# Patient Record
Sex: MALE | Race: BLACK OR AFRICAN AMERICAN | NOT HISPANIC OR LATINO | Employment: FULL TIME | URBAN - METROPOLITAN AREA
[De-identification: names, ages, dates, MRNs, and addresses within clinical notes are randomized per-mention and may not be internally consistent; named-entity substitution may affect disease eponyms.]

---

## 2019-02-14 ENCOUNTER — TREATMENT (OUTPATIENT)
Dept: PHYSICAL THERAPY | Facility: CLINIC | Age: 54
End: 2019-02-14

## 2019-02-14 ENCOUNTER — TRANSCRIBE ORDERS (OUTPATIENT)
Dept: PHYSICAL THERAPY | Facility: CLINIC | Age: 54
End: 2019-02-14

## 2019-02-14 DIAGNOSIS — Z98.890 S/P ARTHROSCOPIC PARTIAL MEDIAL MENISCECTOMY: Primary | ICD-10-CM

## 2019-02-14 DIAGNOSIS — G89.29 CHRONIC PAIN OF LEFT KNEE: Primary | ICD-10-CM

## 2019-02-14 DIAGNOSIS — M25.562 CHRONIC PAIN OF LEFT KNEE: Primary | ICD-10-CM

## 2019-02-14 PROCEDURE — 97110 THERAPEUTIC EXERCISES: CPT | Performed by: PHYSICAL THERAPIST

## 2019-02-14 PROCEDURE — 97161 PT EVAL LOW COMPLEX 20 MIN: CPT | Performed by: PHYSICAL THERAPIST

## 2019-02-14 NOTE — PROGRESS NOTES
Physical Therapy Initial Evaluation and Plan of Care    Patient: Tony Butler   : 1965  Diagnosis/ICD-10 Code:  Chronic pain of left knee [M25.562, G89.29]  Referring practitioner: No ref. provider found  Date of Initial Visit: 2019  Today's Date: 2019  Patient seen for 1 sessions             Subjective Evaluation    History of Present Illness  Date of surgery: 2019  Mechanism of injury: Pt dealt with some intermittent swelling at first and then it got swollen about 3 months ago and did not get better. He went to the MD and had an MRI which revealed an medial meniscus tear. He had arthroscopic medial menisectomy with a synovectomy.       Patient Occupation: Pt is a  - prolonged kneeling, laddes, standing    Precautions and Work Restrictions: Pt is off until cleared by MD to go back to work Quality of life: good    Pain  Current pain rating: 3  At best pain ratin  At worst pain ratin  Location: inside of the left knee  Quality: dull ache  Relieving factors: rest, ice and medications (percocet)  Aggravating factors: ambulation, stairs, standing and movement  Progression: no change    Diagnostic Tests  No diagnostic tests performed    Treatments  No previous or current treatments  Patient Goals  Patient goals for therapy: decreased edema, decreased pain, improved balance, increased motion, increased strength, independence with ADLs/IADLs and return to work             Objective       Palpation     Additional Palpation Details  Mild TTP noted along the medial joint line of the left knee     Active Range of Motion   Left Knee   Flexion: 90 degrees   Extension: 0 degrees     Additional Active Range of Motion Details  Pt was able to get to 90 degrees knee flexion without any increase in pain in the knee. He most likely had more available ROM, but ROM was limited due to the recent surgery    Strength/Myotome Testing     Left Hip   Planes of Motion   Extension: 4-  Abduction: 4-    Left  Knee   Flexion: 4+  Extension: 4  Quadriceps contraction: fair    Ambulation   Weight-Bearing Status   Weight-Bearing Status (Left): weight-bearing as tolerated     Ambulation: Level Surfaces     Additional Level Surfaces Ambulation Details  Decreased weight acceptance onto the left LE and decreased knee flexion noted during ambulation          Assessment & Plan     Assessment  Impairments: abnormal gait, abnormal muscle tone, abnormal or restricted ROM, activity intolerance, impaired balance, impaired physical strength, lacks appropriate home exercise program, pain with function and weight-bearing intolerance  Assessment details: Patient is a 54 year old male who comes to physical therapy s/p left medial menisectomy resulting in pain, decreased ROM, decreased strength, and inability to perform all essential functional activities. Pt will benefit from skilled PT services to address the above issues.     Prognosis details:   SHORT TERM GOALS:  2 weeks       1. Pt independent with HEP  2. Pt to demonstrate eyad hip strength 4/5 or greater to improve stability with ambulation  3. Pt to report being able to walk for 10 minutes without increasing pain in the left knee    LONG TERM GOALS:   6 weeks  1. Pt to demonstrate ability to perform full functional squat with good form and control of the knees and without increasing pain  2. Pt to demonstrate eyad hip strength to 4+/5 or greater to improve safety with ambulation on uneven surfaces  3. Pt to return to work full duty without increased pain in the left knee   4. Pt to demonstrate ability to perform step up/down 8 inch step x10 safely and without pain in the left knee     Functional Limitations: carrying objects, lifting, walking, uncomfortable because of pain, sitting and standing  Plan  Therapy options: will be seen for skilled physical therapy services  Planned modality interventions: cryotherapy, electrical stimulation/Russian stimulation, high voltage pulsed current  (pain management), iontophoresis, microcurrent electrical stimulation, TENS, thermotherapy (hydrocollator packs) and ultrasound  Planned therapy interventions: abdominal trunk stabilization, ADL retraining, balance/weight-bearing training, body mechanics training, flexibility, functional ROM exercises, gait training, home exercise program, IADL retraining, joint mobilization, manual therapy, motor coordination training, neuromuscular re-education, soft tissue mobilization, strengthening, stretching and therapeutic activities  Frequency: 2x week  Duration in weeks: 6  Treatment plan discussed with: patient        Manual Therapy:         mins  01661;  Therapeutic Exercise:    30     mins  59194;     Neuromuscular Morgan:        mins  99198;    Therapeutic Activity:          mins  79602;     Gait Training:           mins  78048;     Ultrasound:          mins  42791;    Electrical Stimulation:         mins  56673 ( );  Iontophoresis          mins 93905   Traction          mins  47618  Fluidotherapy          mins  92396  Dry Needling          mins self-pay  Paraffin          mins  27611    Timed Treatment:   30   mins   Total Treatment:     63   mins    PT SIGNATURE: Glenn Barber, PT, DPT, OCS, Cert. DN   DATE TREATMENT INITIATED: 2/14/2019    Initial Certification  Certification Period: 5/15/2019  I certify that the therapy services are furnished while this patient is under my care.  The services outlined above are required by this patient, and will be reviewed every 90 days.     PHYSICIAN:       DATE:     Please sign and return via fax to [phone].. Thank you, [facility].

## 2019-02-19 ENCOUNTER — TREATMENT (OUTPATIENT)
Dept: PHYSICAL THERAPY | Facility: CLINIC | Age: 54
End: 2019-02-19

## 2019-02-19 DIAGNOSIS — M25.562 CHRONIC PAIN OF LEFT KNEE: Primary | ICD-10-CM

## 2019-02-19 DIAGNOSIS — G89.29 CHRONIC PAIN OF LEFT KNEE: Primary | ICD-10-CM

## 2019-02-19 PROCEDURE — 97140 MANUAL THERAPY 1/> REGIONS: CPT | Performed by: PHYSICAL THERAPIST

## 2019-02-19 PROCEDURE — 97112 NEUROMUSCULAR REEDUCATION: CPT | Performed by: PHYSICAL THERAPIST

## 2019-02-19 PROCEDURE — 97110 THERAPEUTIC EXERCISES: CPT | Performed by: PHYSICAL THERAPIST

## 2019-02-21 ENCOUNTER — TREATMENT (OUTPATIENT)
Dept: PHYSICAL THERAPY | Facility: CLINIC | Age: 54
End: 2019-02-21

## 2019-02-21 DIAGNOSIS — M25.562 CHRONIC PAIN OF LEFT KNEE: Primary | ICD-10-CM

## 2019-02-21 DIAGNOSIS — G89.29 CHRONIC PAIN OF LEFT KNEE: Primary | ICD-10-CM

## 2019-02-21 PROCEDURE — 97140 MANUAL THERAPY 1/> REGIONS: CPT | Performed by: PHYSICAL THERAPIST

## 2019-02-21 PROCEDURE — 97110 THERAPEUTIC EXERCISES: CPT | Performed by: PHYSICAL THERAPIST

## 2019-02-21 PROCEDURE — 97112 NEUROMUSCULAR REEDUCATION: CPT | Performed by: PHYSICAL THERAPIST

## 2019-02-21 NOTE — PROGRESS NOTES
Physical Therapy Daily Progress Note    Subjective   Tony Butler reports that he was very sore for a couple of days after his initial visit, but he feels okay now. He also feels like his knee is a little more stiff now than it was after the surgery     Today's Pain ratin/10    Objective   See Exercise, Manual, and Modality Logs for complete treatment.       Assessment/Plan    Pt able to perform all exercise in the clinic without exacerbation of symptoms. He demonstrates a good understanding of his HEP and he had improvement in his ROM after manual therapy.     Progress per Plan of Care and Progress strengthening /stabilization /functional activity           Manual Therapy:    16     mins  61595;  Therapeutic Exercise:    28     mins  17265;     Neuromuscular Morgan:    15    mins  94509;    Therapeutic Activity:          mins  72822;     Gait Training:           mins  94418;     Ultrasound:          mins  13013;    Electrical Stimulation:    20     mins  85161 ( );  Iontophoresis          mins 55764   Traction          mins  18401  Fluidotherapy          mins  08164  Dry Needling          mins self-pay  Paraffin          mins  11803    Timed Treatment:   59   mins   Total Treatment:     79   mins    Glenn Barber, PT, DPT, OCS, Cert. DN  Physical Therapist

## 2019-02-24 NOTE — PROGRESS NOTES
Physical Therapy Daily Progress Note    Subjective   Tony Butler reports that he was not as sore after his last treatment as he was after the first. He has a little bit of popping in the knee with movement, but he doesn;t have any pain with the popping.     Today's Pain ratin/10    Objective   See Exercise, Manual, and Modality Logs for complete treatment.       Assessment/Plan     Pt tolerated addition of weight to the exercises well and he did not report having any significant increase in his pain. Will continue to progress actvity as tolerated.     Progress per Plan of Care and Progress strengthening /stabilization /functional activity           Manual Therapy:    14     mins  93368;  Therapeutic Exercise:    25     mins  71400;     Neuromuscular Morgan:    18    mins  66094;    Therapeutic Activity:          mins  56239;     Gait Training:           mins  09582;     Ultrasound:          mins  21773;    Electrical Stimulation:    20     mins  33076 ( );  Iontophoresis          mins 34312   Traction         mins  36209  Fluidotherapy          mins  49984  Dry Needling          mins self-pay  Paraffin          mins  13452    Timed Treatment:   57   mins   Total Treatment:     77   mins    Glenn Barber, PT, DPT, OCS, Cert. DN  Physical Therapist

## 2019-02-26 ENCOUNTER — TREATMENT (OUTPATIENT)
Dept: PHYSICAL THERAPY | Facility: CLINIC | Age: 54
End: 2019-02-26

## 2019-02-26 DIAGNOSIS — M25.562 CHRONIC PAIN OF LEFT KNEE: Primary | ICD-10-CM

## 2019-02-26 DIAGNOSIS — G89.29 CHRONIC PAIN OF LEFT KNEE: Primary | ICD-10-CM

## 2019-02-26 PROCEDURE — 97110 THERAPEUTIC EXERCISES: CPT | Performed by: PHYSICAL THERAPIST

## 2019-02-26 PROCEDURE — 97112 NEUROMUSCULAR REEDUCATION: CPT | Performed by: PHYSICAL THERAPIST

## 2019-02-26 PROCEDURE — 97014 ELECTRIC STIMULATION THERAPY: CPT | Performed by: PHYSICAL THERAPIST

## 2019-02-28 ENCOUNTER — TREATMENT (OUTPATIENT)
Dept: PHYSICAL THERAPY | Facility: CLINIC | Age: 54
End: 2019-02-28

## 2019-02-28 DIAGNOSIS — M25.562 CHRONIC PAIN OF LEFT KNEE: Primary | ICD-10-CM

## 2019-02-28 DIAGNOSIS — G89.29 CHRONIC PAIN OF LEFT KNEE: Primary | ICD-10-CM

## 2019-02-28 PROCEDURE — 97014 ELECTRIC STIMULATION THERAPY: CPT | Performed by: PHYSICAL THERAPIST

## 2019-02-28 PROCEDURE — 97110 THERAPEUTIC EXERCISES: CPT | Performed by: PHYSICAL THERAPIST

## 2019-02-28 PROCEDURE — 97530 THERAPEUTIC ACTIVITIES: CPT | Performed by: PHYSICAL THERAPIST

## 2019-02-28 NOTE — PROGRESS NOTES
Physical Therapy Daily Progress Note    Subjective   Tony Butler reports that he is having much less pain in the knee, but he still has some popping. He has a little stiffness in the knee but it improvement with movement.     Today's Pain rating: 3/10    Objective   See Exercise, Manual, and Modality Logs for complete treatment.       Assessment/Plan     Pt is making steady progress with therapy and he demonstrates an improvement in his strength and tolerance to activity. Will continue to progress as tolerated.     Progress per Plan of Care and Progress strengthening /stabilization /functional activity           Manual Therapy:         mins  29622;  Therapeutic Exercise:    32     mins  86773;     Neuromuscular Morgan:    15    mins  93119;    Therapeutic Activity:          mins  22621;     Gait Training:           mins  98564;     Ultrasound:          mins  98131;    Electrical Stimulation:    20     mins  91120 ( );  Iontophoresis          mins 62222   Traction          mins  97698  Fluidotherapy          mins  51605  Dry Needling          mins self-pay  Paraffin          mins  50466    Timed Treatment:   47   mins   Total Treatment:     67   mins    Glenn Barber, PT, DPT, OCS, Cert. DN  Physical Therapist

## 2019-03-05 ENCOUNTER — TREATMENT (OUTPATIENT)
Dept: PHYSICAL THERAPY | Facility: CLINIC | Age: 54
End: 2019-03-05

## 2019-03-05 PROCEDURE — 97110 THERAPEUTIC EXERCISES: CPT | Performed by: PHYSICAL THERAPIST

## 2019-03-05 PROCEDURE — 97530 THERAPEUTIC ACTIVITIES: CPT | Performed by: PHYSICAL THERAPIST

## 2019-03-07 ENCOUNTER — TREATMENT (OUTPATIENT)
Dept: PHYSICAL THERAPY | Facility: CLINIC | Age: 54
End: 2019-03-07

## 2019-03-07 PROCEDURE — 97530 THERAPEUTIC ACTIVITIES: CPT | Performed by: PHYSICAL THERAPIST

## 2019-03-07 PROCEDURE — 97032 APPL MODALITY 1+ESTIM EA 15: CPT | Performed by: PHYSICAL THERAPIST

## 2019-03-07 PROCEDURE — 97110 THERAPEUTIC EXERCISES: CPT | Performed by: PHYSICAL THERAPIST

## 2019-03-07 NOTE — PROGRESS NOTES
Physical Therapy Daily Progress Note    Subjective   Tony Butler reports: Tony reports feeling sore in his left quadriceps and hamstrings, but acknowledges that it is due to the implementation of new and more challenging exercises the previous treatment session. He states that he is continuing to notice improvements in his left knee strength and mobility.     Today's Pain ratin/10    Objective   See Exercise, Manual, and Modality Logs for complete treatment.       Assessment/Plan     The patient responded well to implementation of more work-related and weight bearing activities in preparation for resuming work next week. He notes being sore, however, pain was not exacerbated with increase in activity and patient states the increase in functional activities will be beneficial for his recovery. Will monitor patient's response to return to work during the next scheduled visit.     Progress strengthening /stabilization /functional activity           Manual Therapy:         mins  29385;  Therapeutic Exercise:    27     mins  94218;     Neuromuscular Morgan:        mins  06076;    Therapeutic Activity:     28     mins  75365;     Gait Training:           mins  84221;     Ultrasound:          mins  58693;    Electrical Stimulation:    20     mins  52417 ( );  Iontophoresis          mins 80545   Traction          mins  20877  Fluidotherapy          mins  91053  Dry Needling          mins self-pay  Paraffin          mins  85150    Timed Treatment:   55   mins   Total Treatment:     75   mins    Glenn Barber, PT, DPT, OCS, Cert. DN  Physical Therapist

## 2019-03-11 ENCOUNTER — TREATMENT (OUTPATIENT)
Dept: PHYSICAL THERAPY | Facility: CLINIC | Age: 54
End: 2019-03-11

## 2019-03-11 DIAGNOSIS — G89.29 CHRONIC PAIN OF LEFT KNEE: Primary | ICD-10-CM

## 2019-03-11 DIAGNOSIS — M25.562 CHRONIC PAIN OF LEFT KNEE: Primary | ICD-10-CM

## 2019-03-11 PROCEDURE — 97530 THERAPEUTIC ACTIVITIES: CPT | Performed by: PHYSICAL THERAPIST

## 2019-03-11 PROCEDURE — 97110 THERAPEUTIC EXERCISES: CPT | Performed by: PHYSICAL THERAPIST

## 2019-03-11 NOTE — PROGRESS NOTES
Physical Therapy Daily Progress Note    Patient: Tony Butler   : 1965  Diagnosis/ICD-10 Code:  No primary diagnosis found.  Referring practitioner: Omi Allred MD  Date of Initial Visit: No linked episodes  Today's Date: 3/11/2019  Patient seen for Visit count could not be calculated. Make sure you are using a visit which is associated with an episode. sessions             Subjective   Tony Butler reports: Feeling stronger in his knee following last treatment session and reports no discomfort following working today for 4 hours. He states he is about 70% of the way to reaching his strength and mobility goals, and feels as if he is improving in both domains at a steady rate.      Objective     See Exercise, Manual, and Modality Logs for complete treatment.       Assessment/Plan  Exercises performed by the patient today were progressed via increase in resistance and incorporation of a few new activities. The patient continues to show improvements in right knee strength, mobility and stability and will continue to progress pt's exercise to more challenging tasks, based on his work activities, as tolerated.   Progress strengthening /stabilization /functional activity           Manual Therapy:         mins  89988;  Therapeutic Exercise:    35     mins  69754;     Neuromuscular Morgan:        mins  95428;    Therapeutic Activity:     40     mins  36776;     Gait Training:           mins  65337;     Ultrasound:          mins  92825;    Electrical Stimulation:    20     mins  23331 ( );  Iontophoresis          mins 63261   Traction          mins  97295  Fluidotherapy          mins  92644  Dry Needling          mins self-pay  Paraffin          mins  48167    Timed Treatment:   75   mins   Total Treatment:     95   mins    I was present in the PT Department guiding the student by approving, concurring, and confirming the skilled judgement for all services rendered.     Chava Delgado, Physical Therapy  Student    Glenn Barber, PT  Physical Therapist

## 2019-03-14 ENCOUNTER — TREATMENT (OUTPATIENT)
Dept: PHYSICAL THERAPY | Facility: CLINIC | Age: 54
End: 2019-03-14

## 2019-03-14 DIAGNOSIS — M25.562 LEFT KNEE PAIN, UNSPECIFIED CHRONICITY: Primary | ICD-10-CM

## 2019-03-14 PROCEDURE — 97110 THERAPEUTIC EXERCISES: CPT | Performed by: PHYSICAL THERAPIST

## 2019-03-14 PROCEDURE — 97530 THERAPEUTIC ACTIVITIES: CPT | Performed by: PHYSICAL THERAPIST

## 2019-03-14 PROCEDURE — 97112 NEUROMUSCULAR REEDUCATION: CPT | Performed by: PHYSICAL THERAPIST

## 2019-03-14 NOTE — PROGRESS NOTES
Physical Therapy Daily Progress Note    Patient: Tony Butler   : 1965  Diagnosis/ICD-10 Code:  Left knee pain, unspecified chronicity [M25.562]  Referring practitioner: Omi Allred MD  Date of Initial Visit: Type: THERAPY  Noted: 2019  Today's Date: 3/14/2019  Patient seen for 9 sessions             Subjective   Tony Butler reports: Patient reports that he has been able to complete all activities at work with no difficulty and has been feeling no discomfort with work. He states that today he was on his feet all day, and no adverse effects occurred. He believes therapy is continually increasing his strength.     Objective     See Exercise, Manual, and Modality Logs for complete treatment.       Assessment/Plan  All exercises were performed, with the addition of 2 new exercises to challenge pt's static/dynamic balance and coordination simultaneously. He was able to perform the new exercises effectively, however, the pt stated that they challenged his core stability and single leg balance making the exercise difficult for him to complete. Will continue to progress to increasingly challenging tasks next session, based on his work activities, as tolerated.   Progress strengthening /stabilization /functional activity           Manual Therapy:         mins  78908;  Therapeutic Exercise:    33    mins  83567;     Neuromuscular Morgan:    10    mins  05219;    Therapeutic Activity:     33     mins  60084;     Gait Training:           mins  67154;     Ultrasound:          mins  51105;    Electrical Stimulation:    20     mins  91650 ( );  Iontophoresis          mins 10115   Traction          mins  80429  Fluidotherapy          mins  70834  Dry Needling          mins self-pay  Paraffin          mins  26668    Timed Treatment:   76   mins   Total Treatment:    96   mins    I was present in the PT Department guiding the student by approving, concurring, and confirming the skilled judgement for all  services rendered.   Chava Delgado, Physical Therapy Student  Glenn Barber, PT  Physical Therapist

## 2019-03-19 ENCOUNTER — TREATMENT (OUTPATIENT)
Dept: PHYSICAL THERAPY | Facility: CLINIC | Age: 54
End: 2019-03-19

## 2019-03-19 DIAGNOSIS — G89.29 CHRONIC PAIN OF LEFT KNEE: Primary | ICD-10-CM

## 2019-03-19 DIAGNOSIS — M25.562 CHRONIC PAIN OF LEFT KNEE: Primary | ICD-10-CM

## 2019-03-19 PROCEDURE — 97530 THERAPEUTIC ACTIVITIES: CPT | Performed by: PHYSICAL THERAPIST

## 2019-03-19 PROCEDURE — 97112 NEUROMUSCULAR REEDUCATION: CPT | Performed by: PHYSICAL THERAPIST

## 2019-03-19 PROCEDURE — 97110 THERAPEUTIC EXERCISES: CPT | Performed by: PHYSICAL THERAPIST

## 2019-03-19 NOTE — PROGRESS NOTES
Physical Therapy Daily Progress Note    Patient: Tony Butler   : 1965  Diagnosis/ICD-10 Code:  Chronic pain of left knee [M25.562, G89.29]  Referring practitioner: Omi Allred MD  Date of Initial Visit: Type: THERAPY  Noted: 2019  Today's Date: 3/19/2019  Patient seen for 10 sessions             Subjective   Tony Butler reports: Patient reports that he has continued to work 4 hour shifts with no issues in his left knee. He still isn't able to fully perform all work duties, however, he states that he is able to perform many work activities without pain or discomfort and feels stronger performing such activities.     Objective     See Exercise, Manual, and Modality Logs for complete treatment.       Assessment/Plan  Pt responded to treatment session well, with no adverse effects. He continues to display impressive gains in strength, mobility, and stability with therapy. He was able to progress intensity in a variety of exercises today, in which he was able to perform with no difficulty. Following treatment, he stated he felt sore but not pain. Will continue to progress therapy based on pt tolerance and improvement.   Progress strengthening /stabilization /functional activity           Manual Therapy:         mins  76862;  Therapeutic Exercise:    28     mins  58894;     Neuromuscular Morgan:    15    mins  74764;    Therapeutic Activity:     34     mins  99773;     Gait Training:           mins  32114;     Ultrasound:          mins  91933;    Electrical Stimulation:    20     mins  42007 ( );  Iontophoresis          mins 18937   Traction          mins  75114  Fluidotherapy          mins  29402  Dry Needling          mins self-pay  Paraffin          mins  17076    Timed Treatment:   77   mins   Total Treatment:     97   mins    I was present in the PT Department guiding the student by approving, concurring, and confirming the skilled judgement for all services rendered.   Chava Delgado,  Physical Therapy Student  Glenn Barber, PT  Physical Therapist

## 2019-03-21 ENCOUNTER — TREATMENT (OUTPATIENT)
Dept: PHYSICAL THERAPY | Facility: CLINIC | Age: 54
End: 2019-03-21

## 2019-03-21 DIAGNOSIS — M25.562 CHRONIC PAIN OF LEFT KNEE: Primary | ICD-10-CM

## 2019-03-21 DIAGNOSIS — G89.29 CHRONIC PAIN OF LEFT KNEE: Primary | ICD-10-CM

## 2019-03-21 PROCEDURE — 97112 NEUROMUSCULAR REEDUCATION: CPT | Performed by: PHYSICAL THERAPIST

## 2019-03-21 PROCEDURE — 97110 THERAPEUTIC EXERCISES: CPT | Performed by: PHYSICAL THERAPIST

## 2019-03-21 PROCEDURE — 97530 THERAPEUTIC ACTIVITIES: CPT | Performed by: PHYSICAL THERAPIST

## 2019-03-21 NOTE — PROGRESS NOTES
Physical Therapy Daily Progress Note    Patient: Tony Butler   : 1965  Diagnosis/ICD-10 Code:  Chronic pain of left knee [M25.562, G89.29]  Referring practitioner: Omi Allred MD  Date of Initial Visit: Type: THERAPY  Noted: 2019  Today's Date: 3/21/2019  Patient seen for 11 sessions             Subjective   Tony Butler reports: Pt reports that he has been working 4 hours per day for almost two full weeks with not adverse effects in his left knee. He states he gradually has been increasing his workload and is confident he is nearing a return to full time work. He also mentioned that he has an appointment with his physician tomorrow and will discuss the possibility of returning to full-time work.     Objective     See Exercise, Manual, and Modality Logs for complete treatment.       Assessment/Plan  Pt was able to progress a few exercises by increasing the intensity, and he was also able to perform a new exercise without any adverse effect. He continues to display impressive improvements in knee mobility, stability, control, strength, and endurance. He has been able to improve with each treatment session, especially when training for work like activities in the clinic. Will continue to progress treatment based on pt's tolerance.    Progress strengthening /stabilization /functional activity           Manual Therapy:         mins  15046;  Therapeutic Exercise:    23     mins  82038;     Neuromuscular Morgan:    15    mins  07677;    Therapeutic Activity:     36     mins  51921;     Gait Training:           mins  99100;     Ultrasound:          mins  56219;    Electrical Stimulation:   20      mins  08523 ( );  Iontophoresis          mins 96150   Traction          mins  88402  Fluidotherapy          mins  15354  Dry Needling          mins self-pay  Paraffin          mins  65918    Timed Treatment:   74   mins   Total Treatment:     94   mins    I was present in the PT Department guiding the  student by approving, concurring, and confirming the skilled judgement for all services rendered.   Chava Delgado, Physical Therapy Student  Glenn Barber, PT  Physical Therapist

## 2020-01-23 ENCOUNTER — OFFICE VISIT (OUTPATIENT)
Dept: INTERNAL MEDICINE | Facility: CLINIC | Age: 55
End: 2020-01-23

## 2020-01-23 VITALS
TEMPERATURE: 97.5 F | OXYGEN SATURATION: 100 % | DIASTOLIC BLOOD PRESSURE: 86 MMHG | HEART RATE: 60 BPM | SYSTOLIC BLOOD PRESSURE: 124 MMHG | WEIGHT: 254.4 LBS | HEIGHT: 71 IN | BODY MASS INDEX: 35.61 KG/M2 | RESPIRATION RATE: 18 BRPM

## 2020-01-23 DIAGNOSIS — Z13.220 LIPID SCREENING: ICD-10-CM

## 2020-01-23 DIAGNOSIS — Z13.0 SCREENING FOR BLOOD DISEASE: ICD-10-CM

## 2020-01-23 DIAGNOSIS — Z13.29 THYROID DISORDER SCREENING: ICD-10-CM

## 2020-01-23 DIAGNOSIS — Z00.00 WELLNESS EXAMINATION: Primary | ICD-10-CM

## 2020-01-23 DIAGNOSIS — Z12.5 SCREENING FOR PROSTATE CANCER: ICD-10-CM

## 2020-01-23 DIAGNOSIS — Z13.21 ENCOUNTER FOR VITAMIN DEFICIENCY SCREENING: ICD-10-CM

## 2020-01-23 DIAGNOSIS — I83.892 VARICOSE VEINS OF LEFT LOWER EXTREMITY WITH OTHER COMPLICATIONS: ICD-10-CM

## 2020-01-23 LAB
25(OH)D3 SERPL-MCNC: 23.6 NG/ML (ref 30–100)
ALBUMIN SERPL-MCNC: 4.5 G/DL (ref 3.5–5.2)
ALBUMIN/GLOB SERPL: 1.6 G/DL
ALP SERPL-CCNC: 67 U/L (ref 39–117)
ALT SERPL W P-5'-P-CCNC: 18 U/L (ref 1–41)
ANION GAP SERPL CALCULATED.3IONS-SCNC: 14.7 MMOL/L (ref 5–15)
AST SERPL-CCNC: 21 U/L (ref 1–40)
BASOPHILS # BLD AUTO: 0.02 10*3/MM3 (ref 0–0.2)
BASOPHILS NFR BLD AUTO: 0.5 % (ref 0–1.5)
BILIRUB SERPL-MCNC: 0.5 MG/DL (ref 0.2–1.2)
BUN BLD-MCNC: 10 MG/DL (ref 6–20)
BUN/CREAT SERPL: 10.1 (ref 7–25)
CALCIUM SPEC-SCNC: 9.3 MG/DL (ref 8.6–10.5)
CHLORIDE SERPL-SCNC: 104 MMOL/L (ref 98–107)
CHOLEST SERPL-MCNC: 153 MG/DL (ref 0–200)
CO2 SERPL-SCNC: 27.3 MMOL/L (ref 22–29)
CREAT BLD-MCNC: 0.99 MG/DL (ref 0.76–1.27)
DEPRECATED RDW RBC AUTO: 43.5 FL (ref 37–54)
EOSINOPHIL # BLD AUTO: 0.07 10*3/MM3 (ref 0–0.4)
EOSINOPHIL NFR BLD AUTO: 1.8 % (ref 0.3–6.2)
ERYTHROCYTE [DISTWIDTH] IN BLOOD BY AUTOMATED COUNT: 13.3 % (ref 12.3–15.4)
GFR SERPL CREATININE-BSD FRML MDRD: 94 ML/MIN/1.73
GLOBULIN UR ELPH-MCNC: 2.8 GM/DL
GLUCOSE BLD-MCNC: 92 MG/DL (ref 65–99)
HCT VFR BLD AUTO: 47.3 % (ref 37.5–51)
HDLC SERPL-MCNC: 38 MG/DL (ref 40–60)
HGB BLD-MCNC: 15.8 G/DL (ref 13–17.7)
IMM GRANULOCYTES # BLD AUTO: 0.01 10*3/MM3 (ref 0–0.05)
IMM GRANULOCYTES NFR BLD AUTO: 0.3 % (ref 0–0.5)
LDLC SERPL CALC-MCNC: 102 MG/DL (ref 0–100)
LDLC/HDLC SERPL: 2.69 {RATIO}
LYMPHOCYTES # BLD AUTO: 1.33 10*3/MM3 (ref 0.7–3.1)
LYMPHOCYTES NFR BLD AUTO: 33.8 % (ref 19.6–45.3)
MCH RBC QN AUTO: 29.6 PG (ref 26.6–33)
MCHC RBC AUTO-ENTMCNC: 33.4 G/DL (ref 31.5–35.7)
MCV RBC AUTO: 88.6 FL (ref 79–97)
MONOCYTES # BLD AUTO: 0.41 10*3/MM3 (ref 0.1–0.9)
MONOCYTES NFR BLD AUTO: 10.4 % (ref 5–12)
NEUTROPHILS # BLD AUTO: 2.09 10*3/MM3 (ref 1.7–7)
NEUTROPHILS NFR BLD AUTO: 53.2 % (ref 42.7–76)
NRBC BLD AUTO-RTO: 0 /100 WBC (ref 0–0.2)
PLATELET # BLD AUTO: 252 10*3/MM3 (ref 140–450)
PMV BLD AUTO: 9.9 FL (ref 6–12)
POTASSIUM BLD-SCNC: 4.8 MMOL/L (ref 3.5–5.2)
PROT SERPL-MCNC: 7.3 G/DL (ref 6–8.5)
PSA SERPL-MCNC: 0.44 NG/ML (ref 0–4)
RBC # BLD AUTO: 5.34 10*6/MM3 (ref 4.14–5.8)
SODIUM BLD-SCNC: 146 MMOL/L (ref 136–145)
TRIGL SERPL-MCNC: 63 MG/DL (ref 0–150)
TSH SERPL DL<=0.05 MIU/L-ACNC: 1.69 UIU/ML (ref 0.27–4.2)
VIT B12 BLD-MCNC: <150 PG/ML (ref 211–946)
VLDLC SERPL-MCNC: 12.6 MG/DL (ref 5–40)
WBC NRBC COR # BLD: 3.93 10*3/MM3 (ref 3.4–10.8)

## 2020-01-23 PROCEDURE — 82607 VITAMIN B-12: CPT | Performed by: NURSE PRACTITIONER

## 2020-01-23 PROCEDURE — 85025 COMPLETE CBC W/AUTO DIFF WBC: CPT | Performed by: NURSE PRACTITIONER

## 2020-01-23 PROCEDURE — 82306 VITAMIN D 25 HYDROXY: CPT | Performed by: NURSE PRACTITIONER

## 2020-01-23 PROCEDURE — 80053 COMPREHEN METABOLIC PANEL: CPT | Performed by: NURSE PRACTITIONER

## 2020-01-23 PROCEDURE — G0103 PSA SCREENING: HCPCS | Performed by: NURSE PRACTITIONER

## 2020-01-23 PROCEDURE — 99386 PREV VISIT NEW AGE 40-64: CPT | Performed by: NURSE PRACTITIONER

## 2020-01-23 PROCEDURE — 80061 LIPID PANEL: CPT | Performed by: NURSE PRACTITIONER

## 2020-01-23 PROCEDURE — 84443 ASSAY THYROID STIM HORMONE: CPT | Performed by: NURSE PRACTITIONER

## 2020-01-23 NOTE — PROGRESS NOTES
Subjective   Chief Complaint   Patient presents with   • Annual Exam   • Leg Pain     Left outer thigh-pain&numbness first noticed symptoms 1/11/20       Tony Butler is a 55 y.o. male here today to establish care for annual exam. Overall he is very healthy.  He is not taking any medications currently.  He eats a heart healthy low-cholesterol diet.  He is very physically active at work.  He had blood work completed 1 year ago that was normal.  He completed Cologuard 1 year ago that was normal.  He has some varicose veins on his left upper thigh with a small area of numbness that began a couple of weeks ago.  No prior injuries to this area.  No lower back pain.  He denies any shortness of breath or chest pain.     Overall healthy: Yes  Regular exercise:  Yes  Diet is well balanced:  Yes  Vitamin Supplement:  No, will start multivitamin  Alcohol intake:  No   Tobacco use:  No    Cardiovascular risk is low:  low   PSA: unsure if done prior. No family history. No urinary issues.   ED or bedroom issues: No  Concern for STDs:  No  Last colon screening:  cologuard one year ago - negative.   Regular dental exam:  No, will schedule.  Regular eye exam:  Yes  Immunizations up to date:  Yes, had flu shot  Wear a seatbelt regularly:  Yes  Wear sunscreen regularly when outdoors:  No, wears hat but will start       Review of Systems   Constitutional: Negative for activity change, appetite change, chills, diaphoresis, fatigue, fever, unexpected weight gain and unexpected weight loss.   HENT: Negative for ear discharge, ear pain, mouth sores, nosebleeds, sinus pressure, sneezing and sore throat.    Eyes: Negative for pain, discharge and itching.   Respiratory: Negative for cough, chest tightness, shortness of breath and wheezing.    Cardiovascular: Negative for chest pain, palpitations and leg swelling.   Gastrointestinal: Negative for abdominal pain, constipation, diarrhea, nausea and vomiting.   Endocrine: Negative for heat  "intolerance, polydipsia and polyphagia.   Genitourinary: Negative for dysuria, flank pain, frequency, hematuria and urgency.   Musculoskeletal: Negative for arthralgias, back pain, gait problem, joint swelling, myalgias, neck pain and neck stiffness.   Skin: Negative for color change, pallor and rash.   Allergic/Immunologic: Negative for immunocompromised state.   Neurological: Positive for numbness. Negative for seizures, speech difficulty and weakness.   Hematological: Negative for adenopathy.   Psychiatric/Behavioral: Negative for agitation, decreased concentration, sleep disturbance, suicidal ideas and depressed mood. The patient is not nervous/anxious.        Past Medical History:   Diagnosis Date   • Clotting disorder (CMS/HCC)      Past Surgical History:   Procedure Laterality Date   • INGUINAL HERNIA REPAIR  10/2018   • KNEE ARTHROSCOPY W/ MENISCECTOMY Left 01/2018   • UMBILICAL HERNIA REPAIR  10/2018     Family History   Problem Relation Age of Onset   • No Known Problems Mother    • Cancer Father    • Cancer Sister    • Diabetes Brother      Social History     Tobacco Use   Smoking Status Never Smoker   Smokeless Tobacco Never Used      Social History     Substance and Sexual Activity   Alcohol Use Not Currently   • Frequency: Never   • Binge frequency: Never    Comment: 30 years ago      No current outpatient medications on file prior to visit.     No current facility-administered medications on file prior to visit.      No Known Allergies    Objective   Vitals:    01/23/20 0839   BP: 124/86   Pulse: 60   Resp: 18   Temp: 97.5 °F (36.4 °C)   TempSrc: Temporal   SpO2: 100%   Weight: 115 kg (254 lb 6.4 oz)   Height: 179.2 cm (70.57\")   PainSc: 0-No pain     Body mass index is 35.92 kg/m².    Physical Exam   Constitutional: He is oriented to person, place, and time. He appears well-developed and well-nourished.   HENT:   Head: Normocephalic and atraumatic.   Nose: Nose normal.   Eyes: Pupils are equal, " round, and reactive to light. Conjunctivae, EOM and lids are normal.   Neck: Trachea normal and normal range of motion. No thyromegaly present.   Cardiovascular: Normal rate, regular rhythm and normal heart sounds.   Pulmonary/Chest: Effort normal and breath sounds normal. No respiratory distress.   Abdominal: Soft. Bowel sounds are normal. There is no tenderness.   Musculoskeletal: Normal range of motion.   Normal range of motion of all major joints   Neurological: He is alert and oriented to person, place, and time. He has normal strength. GCS eye subscore is 4. GCS verbal subscore is 5. GCS motor subscore is 6.   Skin: Skin is warm and dry. Capillary refill takes 2 to 3 seconds. Turgor is normal.   Small area of varicose veins on left lateral thigh.    Psychiatric: He has a normal mood and affect. His speech is normal and behavior is normal. Thought content normal. Cognition and memory are normal. He expresses no homicidal and no suicidal ideation. He expresses no suicidal plans and no homicidal plans.   Vitals reviewed.      Assessment/Plan   Tony was seen today for annual exam and leg pain.    Diagnoses and all orders for this visit:    Wellness examination  -     CBC Auto Differential  -     Comprehensive Metabolic Panel  -     Lipid Panel  -     Vitamin B12  -     Vitamin D 25 Hydroxy  -     TSH Rfx On Abnormal To Free T4  -     PSA Screen    Varicose veins of left lower extremity with other complications  New onset condition requiring monitoring. Patient will monitor for pain, swelling, worsening of numbness, and discoloration. He will follow up for any change in condition.     Screening for blood disease  -     CBC Auto Differential  -     Comprehensive Metabolic Panel  -     Lipid Panel  -     Vitamin B12  -     Vitamin D 25 Hydroxy  -     TSH Rfx On Abnormal To Free T4    Encounter for vitamin deficiency screening  -     Vitamin B12  -     Vitamin D 25 Hydroxy    Lipid screening  -     Lipid  Panel    Thyroid disorder screening  -     TSH Rfx On Abnormal To Free T4    Screening for prostate cancer  -     PSA Screen         No current outpatient medications on file.       Plan of care reviewed with the patient at the conclusion of today's visit.  Education was provided regarding diagnosis, management, and any prescribed or recommended OTC medications.  Patient verbalized understanding of and agreement with management plan.     Return in about 1 year (around 1/23/2021), or if symptoms worsen or fail to improve, for Annual.      Mary Dutta, NAJMA    Please note that portions of this note were completed with a voice recognition program. Efforts were made to edit the dictations, but occasionally words are mistranscribed.

## 2020-02-05 ENCOUNTER — TELEPHONE (OUTPATIENT)
Dept: INTERNAL MEDICINE | Facility: CLINIC | Age: 55
End: 2020-02-05

## 2020-02-05 RX ORDER — ERGOCALCIFEROL 1.25 MG/1
50000 CAPSULE ORAL WEEKLY
Qty: 4 CAPSULE | Refills: 2 | Status: SHIPPED | OUTPATIENT
Start: 2020-02-05 | End: 2020-12-16

## 2020-02-05 RX ORDER — B-COMPLEX WITH VITAMIN C
1 TABLET ORAL DAILY
Qty: 30 EACH | Refills: 5 | Status: SHIPPED | OUTPATIENT
Start: 2020-02-05 | End: 2020-12-16

## 2020-02-10 ENCOUNTER — TELEPHONE (OUTPATIENT)
Dept: INTERNAL MEDICINE | Facility: CLINIC | Age: 55
End: 2020-02-10

## 2020-02-10 NOTE — TELEPHONE ENCOUNTER
Patient would like for his future medications to go to [facility].

## 2020-10-22 ENCOUNTER — TELEPHONE (OUTPATIENT)
Dept: INTERNAL MEDICINE | Facility: CLINIC | Age: 55
End: 2020-10-22

## 2020-10-22 RX ORDER — AZITHROMYCIN 250 MG/1
TABLET, FILM COATED ORAL
Qty: 6 TABLET | Refills: 0 | Status: SHIPPED | OUTPATIENT
Start: 2020-10-22 | End: 2020-12-16

## 2020-10-22 NOTE — TELEPHONE ENCOUNTER
HE HAS A SINUS INF. AND WANTS TO KNOW IF [facility] WOULD CALL IN A Z-PACK. HE HAS BEEN TESTED TWICE FOR THE COVID. PLEASE CALL IN TO [facility]. [phone]. IF NOT CALL HIS SISTER [phone] HER MICHEAL IS CESARIO.

## 2020-11-11 ENCOUNTER — OFFICE VISIT (OUTPATIENT)
Dept: INTERNAL MEDICINE | Facility: CLINIC | Age: 55
End: 2020-11-11

## 2020-11-11 VITALS
HEIGHT: 71 IN | SYSTOLIC BLOOD PRESSURE: 126 MMHG | WEIGHT: 237 LBS | TEMPERATURE: 97.1 F | DIASTOLIC BLOOD PRESSURE: 78 MMHG | RESPIRATION RATE: 16 BRPM | BODY MASS INDEX: 33.18 KG/M2 | HEART RATE: 61 BPM | OXYGEN SATURATION: 98 %

## 2020-11-11 DIAGNOSIS — E55.9 VITAMIN D DEFICIENCY: ICD-10-CM

## 2020-11-11 DIAGNOSIS — E53.9 VITAMIN B DEFICIENCY: ICD-10-CM

## 2020-11-11 DIAGNOSIS — J01.90 ACUTE NON-RECURRENT SINUSITIS, UNSPECIFIED LOCATION: Primary | ICD-10-CM

## 2020-11-11 PROCEDURE — 90471 IMMUNIZATION ADMIN: CPT | Performed by: NURSE PRACTITIONER

## 2020-11-11 PROCEDURE — 90686 IIV4 VACC NO PRSV 0.5 ML IM: CPT | Performed by: NURSE PRACTITIONER

## 2020-11-11 PROCEDURE — 99213 OFFICE O/P EST LOW 20 MIN: CPT | Performed by: NURSE PRACTITIONER

## 2020-12-01 PROBLEM — E55.9 VITAMIN D DEFICIENCY: Status: ACTIVE | Noted: 2020-12-01

## 2020-12-01 PROBLEM — E53.9 VITAMIN B DEFICIENCY: Status: ACTIVE | Noted: 2020-12-01

## 2020-12-02 NOTE — PROGRESS NOTES
Subjective   Chief Complaint   Patient presents with   • CHANJO-ANN      Tony Butler is a 55 y.o. male here today for follow up on respiratory infection and vitamin deficiencies. Patient contacted office 2 weeks ago with upper airway congestion, facial pain, sore throat, and ear pain. Nasal drainage was thick and green. He responds to zpak well and requested this be called in. He has been tested for COVID x2 with negative results. He still has sinus and ear pressure and wants to get checked. He would like to get flu shot today if possible. Last set of labs show vitamin D and B def. He has been taking vitamin supplements and fatigue has improved.    I have reviewed the following portions of the patient's history and confirmed they are accurate: allergies, current medications, past family history, past medical history, past social history, past surgical history and problem list    I have personally completed the patient's review of systems.    Review of Systems   Constitutional: Negative for activity change, appetite change, chills, diaphoresis, fatigue, fever, unexpected weight gain and unexpected weight loss.   HENT: Positive for congestion, ear pain and sinus pressure. Negative for ear discharge, mouth sores, nosebleeds, sneezing and sore throat.    Eyes: Negative for pain, discharge and itching.   Respiratory: Negative for cough, chest tightness, shortness of breath and wheezing.    Cardiovascular: Negative for chest pain, palpitations and leg swelling.   Gastrointestinal: Negative for abdominal pain, constipation, diarrhea, nausea and vomiting.   Endocrine: Negative for heat intolerance, polydipsia and polyphagia.   Genitourinary: Negative for dysuria, flank pain, frequency, hematuria and urgency.   Musculoskeletal: Negative for arthralgias, back pain, gait problem, joint swelling, myalgias, neck pain and neck stiffness.   Skin: Negative for color change, pallor and rash.   Allergic/Immunologic: Negative for  "immunocompromised state.   Neurological: Negative for seizures, speech difficulty, weakness and numbness.   Hematological: Negative for adenopathy.   Psychiatric/Behavioral: Negative for agitation, decreased concentration, sleep disturbance, suicidal ideas and depressed mood. The patient is not nervous/anxious.        Current Outpatient Medications on File Prior to Visit   Medication Sig   • vitamin D (ERGOCALCIFEROL) 1.25 MG (03526 UT) capsule capsule Take 1 capsule by mouth 1 (One) Time Per Week.   • azithromycin (ZITHROMAX) 250 MG tablet Take 2 tablets the first day, then 1 tablet daily for 4 days.   • B Complex Vitamins (VITAMIN B COMPLEX) tablet Take 1 tablet by mouth Daily.     No current facility-administered medications on file prior to visit.        Objective   Vitals:    11/11/20 1002   BP: 126/78   Pulse: 61   Resp: 16   Temp: 97.1 °F (36.2 °C)   TempSrc: Temporal   SpO2: 98%   Weight: 108 kg (237 lb)   Height: 179.2 cm (70.57\")   PainSc: 0-No pain     Body mass index is 33.46 kg/m².    Physical Exam  Vitals signs reviewed.   Constitutional:       Appearance: Normal appearance. He is well-developed.   HENT:      Head: Normocephalic and atraumatic.      Right Ear: Tympanic membrane is erythematous.      Left Ear: Tympanic membrane is erythematous.      Nose: Mucosal edema present.   Eyes:      General: Lids are normal.      Conjunctiva/sclera: Conjunctivae normal.      Pupils: Pupils are equal, round, and reactive to light.   Neck:      Thyroid: No thyromegaly.      Trachea: Trachea normal.   Cardiovascular:      Rate and Rhythm: Normal rate and regular rhythm.      Heart sounds: Normal heart sounds.   Pulmonary:      Effort: Pulmonary effort is normal. No respiratory distress.      Breath sounds: Normal breath sounds.   Skin:     General: Skin is warm and dry.   Neurological:      Mental Status: He is alert and oriented to person, place, and time.      GCS: GCS eye subscore is 4. GCS verbal subscore is 5. " GCS motor subscore is 6.   Psychiatric:         Attention and Perception: Attention normal.         Mood and Affect: Mood normal.         Speech: Speech normal.         Behavior: Behavior normal. Behavior is cooperative.         Thought Content: Thought content normal.         Assessment/Plan   Tony was seen today for uri.  Diagnoses and all orders for this visit:  Acute non-recurrent sinusitis, unspecified location (Primary)  New onset issue requiring medication management. Suggested coolmist humidifier at home especially at bedtime to help with congestion and breathing.  Suggested OTC anti-histamine such as Claritin or Zyrtec and Flonase nasal spray daily. Can use OTC nasal saline spray and plain Mucinex as needed for congestion. Will eat a well-balanced diet, increase water intake, and get adequate rest.     Vitamin B deficiency  Overview:  Chronic issue requiring diet changes, monitoring, and dietary supplement. Will increase dietary intake of Vitamin B through animal products and/or vitamin B12 or B complex supplement daily.    Vitamin D deficiency  Overview:  Chronic issue requiring diet changes, monitoring, and dietary supplement. Will increase dietary intake of Vitamin D through dairy milk, plant/nut based milk, and fortified foods such as juice and cereal. Will start dietary supplement as directed.     Other orders  -     Fluarix Quad >6 Months (4130-1316)         Current Outpatient Medications:   •  vitamin D (ERGOCALCIFEROL) 1.25 MG (40397 UT) capsule capsule, Take 1 capsule by mouth 1 (One) Time Per Week., Disp: 4 capsule, Rfl: 2  •  azithromycin (ZITHROMAX) 250 MG tablet, Take 2 tablets the first day, then 1 tablet daily for 4 days., Disp: 6 tablet, Rfl: 0  •  B Complex Vitamins (VITAMIN B COMPLEX) tablet, Take 1 tablet by mouth Daily., Disp: 30 each, Rfl: 5       Plan of care reviewed with the patient at the conclusion of today's visit.  Education was provided regarding diagnosis, management, and  any prescribed or recommended OTC medications.  Patient verbalized understanding of and agreement with management plan.     Return in about 1 month (around 12/11/2020), or if symptoms worsen or fail to improve, for Annual.      NAJMA Joe    Please note that portions of this note were completed with a voice recognition program. Efforts were made to edit the dictations, but occasionally words are mistranscribed.

## 2020-12-16 ENCOUNTER — OFFICE VISIT (OUTPATIENT)
Dept: INTERNAL MEDICINE | Facility: CLINIC | Age: 55
End: 2020-12-16

## 2020-12-16 ENCOUNTER — LAB (OUTPATIENT)
Dept: LAB | Facility: HOSPITAL | Age: 55
End: 2020-12-16

## 2020-12-16 VITALS
BODY MASS INDEX: 33.4 KG/M2 | RESPIRATION RATE: 16 BRPM | WEIGHT: 238.6 LBS | HEART RATE: 62 BPM | TEMPERATURE: 96.8 F | SYSTOLIC BLOOD PRESSURE: 108 MMHG | DIASTOLIC BLOOD PRESSURE: 68 MMHG | HEIGHT: 71 IN | OXYGEN SATURATION: 98 %

## 2020-12-16 DIAGNOSIS — Z00.00 WELLNESS EXAMINATION: Primary | ICD-10-CM

## 2020-12-16 DIAGNOSIS — Z12.11 SCREENING FOR COLON CANCER: ICD-10-CM

## 2020-12-16 DIAGNOSIS — Z12.5 SCREENING FOR PROSTATE CANCER: ICD-10-CM

## 2020-12-16 LAB
25(OH)D3 SERPL-MCNC: 29.6 NG/ML (ref 30–100)
ALBUMIN SERPL-MCNC: 4.2 G/DL (ref 3.5–5.2)
ALBUMIN/GLOB SERPL: 1.2 G/DL
ALP SERPL-CCNC: 55 U/L (ref 39–117)
ALT SERPL W P-5'-P-CCNC: 22 U/L (ref 1–41)
ANION GAP SERPL CALCULATED.3IONS-SCNC: 8.1 MMOL/L (ref 5–15)
AST SERPL-CCNC: 24 U/L (ref 1–40)
BILIRUB SERPL-MCNC: 0.9 MG/DL (ref 0–1.2)
BUN SERPL-MCNC: 12 MG/DL (ref 6–20)
BUN/CREAT SERPL: 11.7 (ref 7–25)
CALCIUM SPEC-SCNC: 9.5 MG/DL (ref 8.6–10.5)
CHLORIDE SERPL-SCNC: 102 MMOL/L (ref 98–107)
CHOLEST SERPL-MCNC: 168 MG/DL (ref 0–200)
CO2 SERPL-SCNC: 30.9 MMOL/L (ref 22–29)
CREAT SERPL-MCNC: 1.03 MG/DL (ref 0.76–1.27)
DEPRECATED RDW RBC AUTO: 40.9 FL (ref 37–54)
ERYTHROCYTE [DISTWIDTH] IN BLOOD BY AUTOMATED COUNT: 12.9 % (ref 12.3–15.4)
FOLATE SERPL-MCNC: 12.5 NG/ML (ref 4.78–24.2)
GFR SERPL CREATININE-BSD FRML MDRD: 90 ML/MIN/1.73
GLOBULIN UR ELPH-MCNC: 3.6 GM/DL
GLUCOSE SERPL-MCNC: 83 MG/DL (ref 65–99)
HCT VFR BLD AUTO: 48.5 % (ref 37.5–51)
HDLC SERPL-MCNC: 43 MG/DL (ref 40–60)
HGB BLD-MCNC: 16.5 G/DL (ref 13–17.7)
LDLC SERPL CALC-MCNC: 112 MG/DL (ref 0–100)
LDLC/HDLC SERPL: 2.59 {RATIO}
MCH RBC QN AUTO: 29.6 PG (ref 26.6–33)
MCHC RBC AUTO-ENTMCNC: 34 G/DL (ref 31.5–35.7)
MCV RBC AUTO: 86.9 FL (ref 79–97)
PLATELET # BLD AUTO: 261 10*3/MM3 (ref 140–450)
PMV BLD AUTO: 10.3 FL (ref 6–12)
POTASSIUM SERPL-SCNC: 4.7 MMOL/L (ref 3.5–5.2)
PROT SERPL-MCNC: 7.8 G/DL (ref 6–8.5)
PSA SERPL-MCNC: 0.57 NG/ML (ref 0–4)
RBC # BLD AUTO: 5.58 10*6/MM3 (ref 4.14–5.8)
SODIUM SERPL-SCNC: 141 MMOL/L (ref 136–145)
TRIGL SERPL-MCNC: 69 MG/DL (ref 0–150)
TSH SERPL DL<=0.05 MIU/L-ACNC: 1.24 UIU/ML (ref 0.27–4.2)
VIT B12 BLD-MCNC: 363 PG/ML (ref 211–946)
VLDLC SERPL-MCNC: 13 MG/DL (ref 5–40)
WBC # BLD AUTO: 4.46 10*3/MM3 (ref 3.4–10.8)

## 2020-12-16 PROCEDURE — 99396 PREV VISIT EST AGE 40-64: CPT | Performed by: NURSE PRACTITIONER

## 2020-12-16 PROCEDURE — G0103 PSA SCREENING: HCPCS | Performed by: NURSE PRACTITIONER

## 2020-12-16 PROCEDURE — 82306 VITAMIN D 25 HYDROXY: CPT | Performed by: NURSE PRACTITIONER

## 2020-12-16 PROCEDURE — 84443 ASSAY THYROID STIM HORMONE: CPT | Performed by: NURSE PRACTITIONER

## 2020-12-16 PROCEDURE — 80061 LIPID PANEL: CPT | Performed by: NURSE PRACTITIONER

## 2020-12-16 PROCEDURE — 83036 HEMOGLOBIN GLYCOSYLATED A1C: CPT | Performed by: NURSE PRACTITIONER

## 2020-12-16 PROCEDURE — 82607 VITAMIN B-12: CPT | Performed by: NURSE PRACTITIONER

## 2020-12-16 PROCEDURE — 82746 ASSAY OF FOLIC ACID SERUM: CPT | Performed by: NURSE PRACTITIONER

## 2020-12-16 PROCEDURE — 85027 COMPLETE CBC AUTOMATED: CPT | Performed by: NURSE PRACTITIONER

## 2020-12-16 PROCEDURE — 80053 COMPREHEN METABOLIC PANEL: CPT | Performed by: NURSE PRACTITIONER

## 2020-12-17 LAB — HBA1C MFR BLD: 5.8 % (ref 4.8–5.6)

## 2020-12-21 ENCOUNTER — RESULTS ENCOUNTER (OUTPATIENT)
Dept: INTERNAL MEDICINE | Facility: CLINIC | Age: 55
End: 2020-12-21

## 2020-12-21 DIAGNOSIS — Z12.11 SCREENING FOR COLON CANCER: ICD-10-CM

## 2021-10-11 ENCOUNTER — TELEPHONE (OUTPATIENT)
Dept: INTERNAL MEDICINE | Facility: CLINIC | Age: 56
End: 2021-10-11

## 2021-10-11 NOTE — TELEPHONE ENCOUNTER
Caller: CESARIO BRADFORD    Relationship: Emergency Contact    Best call back number:     What is the best time to reach you: ANYTIME    Who are you requesting to speak with (clinical staff, provider,  specific staff member): CLINICAL STAFF    Do you know the name of the person who called: CESARIO    What was the call regarding: SHE ADVISED THAT PATIENT FLU, SHINGLES, AND PNEUMONIA VACCINE; (GOES BACK IN 2 MTHS FOR SECOND SHINGLES) HE HAD THESE DONE AT [place] [address] [address]   [facility]     Do you require a callback: NO

## 2021-12-21 ENCOUNTER — TELEPHONE (OUTPATIENT)
Dept: INTERNAL MEDICINE | Facility: CLINIC | Age: 56
End: 2021-12-21

## 2021-12-21 NOTE — TELEPHONE ENCOUNTER
Caller: Tony Butler    Relationship to patient: Self    Best call back number: [phone]    PHYSICAL SCHEDULED WITH PROVIDER THAT IS NOT PCP

## 2022-01-05 ENCOUNTER — LAB (OUTPATIENT)
Dept: LAB | Facility: HOSPITAL | Age: 57
End: 2022-01-05

## 2022-01-05 ENCOUNTER — OFFICE VISIT (OUTPATIENT)
Dept: INTERNAL MEDICINE | Facility: CLINIC | Age: 57
End: 2022-01-05

## 2022-01-05 VITALS
TEMPERATURE: 97.3 F | HEART RATE: 76 BPM | OXYGEN SATURATION: 98 % | SYSTOLIC BLOOD PRESSURE: 124 MMHG | WEIGHT: 249.8 LBS | BODY MASS INDEX: 35.76 KG/M2 | DIASTOLIC BLOOD PRESSURE: 80 MMHG | HEIGHT: 70 IN

## 2022-01-05 DIAGNOSIS — R73.03 PREDIABETES: ICD-10-CM

## 2022-01-05 DIAGNOSIS — Z12.5 SCREENING FOR PROSTATE CANCER: ICD-10-CM

## 2022-01-05 DIAGNOSIS — Z00.00 ROUTINE PHYSICAL EXAMINATION: Primary | ICD-10-CM

## 2022-01-05 DIAGNOSIS — E66.09 CLASS 2 OBESITY DUE TO EXCESS CALORIES WITHOUT SERIOUS COMORBIDITY WITH BODY MASS INDEX (BMI) OF 35.0 TO 35.9 IN ADULT: ICD-10-CM

## 2022-01-05 DIAGNOSIS — E55.9 VITAMIN D DEFICIENCY: ICD-10-CM

## 2022-01-05 DIAGNOSIS — Z12.11 SCREENING FOR COLON CANCER: ICD-10-CM

## 2022-01-05 DIAGNOSIS — Z00.00 ROUTINE PHYSICAL EXAMINATION: ICD-10-CM

## 2022-01-05 LAB
ALBUMIN SERPL-MCNC: 4.6 G/DL (ref 3.5–5.2)
ALBUMIN/GLOB SERPL: 1.6 G/DL
ALP SERPL-CCNC: 58 U/L (ref 39–117)
ALT SERPL W P-5'-P-CCNC: 24 U/L (ref 1–41)
ANION GAP SERPL CALCULATED.3IONS-SCNC: 9.5 MMOL/L (ref 5–15)
AST SERPL-CCNC: 23 U/L (ref 1–40)
BASOPHILS # BLD AUTO: 0.03 10*3/MM3 (ref 0–0.2)
BASOPHILS NFR BLD AUTO: 0.6 % (ref 0–1.5)
BILIRUB SERPL-MCNC: 0.8 MG/DL (ref 0–1.2)
BUN SERPL-MCNC: 16 MG/DL (ref 6–20)
BUN/CREAT SERPL: 14.4 (ref 7–25)
CALCIUM SPEC-SCNC: 9.6 MG/DL (ref 8.6–10.5)
CHLORIDE SERPL-SCNC: 103 MMOL/L (ref 98–107)
CHOLEST SERPL-MCNC: 168 MG/DL (ref 0–200)
CO2 SERPL-SCNC: 28.5 MMOL/L (ref 22–29)
CREAT SERPL-MCNC: 1.11 MG/DL (ref 0.76–1.27)
DEPRECATED RDW RBC AUTO: 41.1 FL (ref 37–54)
EOSINOPHIL # BLD AUTO: 0.07 10*3/MM3 (ref 0–0.4)
EOSINOPHIL NFR BLD AUTO: 1.5 % (ref 0.3–6.2)
ERYTHROCYTE [DISTWIDTH] IN BLOOD BY AUTOMATED COUNT: 13 % (ref 12.3–15.4)
GFR SERPL CREATININE-BSD FRML MDRD: 82 ML/MIN/1.73
GLOBULIN UR ELPH-MCNC: 2.9 GM/DL
GLUCOSE SERPL-MCNC: 88 MG/DL (ref 65–99)
HBA1C MFR BLD: 6.16 % (ref 4.8–5.6)
HCT VFR BLD AUTO: 47.2 % (ref 37.5–51)
HDLC SERPL-MCNC: 40 MG/DL (ref 40–60)
HGB BLD-MCNC: 15.8 G/DL (ref 13–17.7)
IMM GRANULOCYTES # BLD AUTO: 0.01 10*3/MM3 (ref 0–0.05)
IMM GRANULOCYTES NFR BLD AUTO: 0.2 % (ref 0–0.5)
LDLC SERPL CALC-MCNC: 114 MG/DL (ref 0–100)
LDLC/HDLC SERPL: 2.84 {RATIO}
LYMPHOCYTES # BLD AUTO: 1.6 10*3/MM3 (ref 0.7–3.1)
LYMPHOCYTES NFR BLD AUTO: 34.3 % (ref 19.6–45.3)
MCH RBC QN AUTO: 29.4 PG (ref 26.6–33)
MCHC RBC AUTO-ENTMCNC: 33.5 G/DL (ref 31.5–35.7)
MCV RBC AUTO: 87.7 FL (ref 79–97)
MONOCYTES # BLD AUTO: 0.55 10*3/MM3 (ref 0.1–0.9)
MONOCYTES NFR BLD AUTO: 11.8 % (ref 5–12)
NEUTROPHILS NFR BLD AUTO: 2.41 10*3/MM3 (ref 1.7–7)
NEUTROPHILS NFR BLD AUTO: 51.6 % (ref 42.7–76)
NRBC BLD AUTO-RTO: 0 /100 WBC (ref 0–0.2)
PLATELET # BLD AUTO: 258 10*3/MM3 (ref 140–450)
PMV BLD AUTO: 10.3 FL (ref 6–12)
POTASSIUM SERPL-SCNC: 4.7 MMOL/L (ref 3.5–5.2)
PROT SERPL-MCNC: 7.5 G/DL (ref 6–8.5)
PSA SERPL-MCNC: 0.56 NG/ML (ref 0–4)
RBC # BLD AUTO: 5.38 10*6/MM3 (ref 4.14–5.8)
SODIUM SERPL-SCNC: 141 MMOL/L (ref 136–145)
TRIGL SERPL-MCNC: 72 MG/DL (ref 0–150)
VLDLC SERPL-MCNC: 14 MG/DL (ref 5–40)
WBC NRBC COR # BLD: 4.67 10*3/MM3 (ref 3.4–10.8)

## 2022-01-05 PROCEDURE — 99396 PREV VISIT EST AGE 40-64: CPT | Performed by: NURSE PRACTITIONER

## 2022-01-05 PROCEDURE — 80061 LIPID PANEL: CPT | Performed by: NURSE PRACTITIONER

## 2022-01-05 PROCEDURE — 36415 COLL VENOUS BLD VENIPUNCTURE: CPT

## 2022-01-05 PROCEDURE — 83036 HEMOGLOBIN GLYCOSYLATED A1C: CPT

## 2022-01-05 PROCEDURE — 82306 VITAMIN D 25 HYDROXY: CPT

## 2022-01-05 PROCEDURE — 85025 COMPLETE CBC W/AUTO DIFF WBC: CPT

## 2022-01-05 PROCEDURE — 80053 COMPREHEN METABOLIC PANEL: CPT | Performed by: NURSE PRACTITIONER

## 2022-01-05 PROCEDURE — G0103 PSA SCREENING: HCPCS

## 2022-01-05 RX ORDER — MULTIPLE VITAMINS W/ MINERALS TAB 9MG-400MCG
1 TAB ORAL DAILY
COMMUNITY

## 2022-01-05 NOTE — PROGRESS NOTES
Subjective   Chief Complaint   Patient presents with   • Annual Exam        Tony Butler is a 56 y.o. male here today for annual exam.    Overall healthy: Yes  Regular exercise:  Not as much as he use to  Diet is well balanced:  No  Vitamin Supplement:  Yes  Alcohol intake:  No   Tobacco use:  No    Cardiovascular risk is low:  Yes   PSA: Due  ED or bedroom issues: No  Concern for STDs:  No  Last colon screening:  Cologuard done 2021 - negative, never had colonoscopy  Regular dental exam:  Yes   Regular eye exam:  Yes  Immunizations up to date:  Yes  Wear a seatbelt regularly:  Yes  Wear sunscreen regularly when outdoors:  No    Review of Systems   Constitutional: Negative for activity change, appetite change and fatigue.   HENT: Negative for congestion.    Respiratory: Negative for cough and shortness of breath.    Cardiovascular: Negative for chest pain and leg swelling.   Gastrointestinal: Negative for abdominal pain.   Neurological: Negative for dizziness, weakness and confusion.   Psychiatric/Behavioral: Negative for behavioral problems and decreased concentration.       The following portions of the patient's history were reviewed and updated as appropriate: allergies, current medications, past family history, past medical history, past social history, past surgical history and problem list.    Past Medical History:   Diagnosis Date   • Clotting disorder (HCC)      Past Surgical History:   Procedure Laterality Date   • INGUINAL HERNIA REPAIR  10/2018   • KNEE ARTHROSCOPY W/ MENISCECTOMY Left 01/2018   • UMBILICAL HERNIA REPAIR  10/2018     Family History   Problem Relation Age of Onset   • No Known Problems Mother    • Cancer Father    • Cancer Sister    • Diabetes Brother      Social History     Tobacco Use   Smoking Status Never Smoker   Smokeless Tobacco Never Used     Social History     Substance and Sexual Activity   Alcohol Use Not Currently    Comment: 30 years ago     No Known Allergies    Current  "Outpatient Medications on File Prior to Visit   Medication Sig   • multivitamin with minerals tablet tablet Take 1 tablet by mouth Daily.     No current facility-administered medications on file prior to visit.       Objective   Vitals:    01/05/22 0800   BP: 124/80   Pulse: 76   Temp: 97.3 °F (36.3 °C)   TempSrc: Temporal   SpO2: 98%   Weight: 113 kg (249 lb 12.8 oz)   Height: 177.8 cm (70\")     Body mass index is 35.84 kg/m².    Physical Exam  Vitals and nursing note reviewed.   HENT:      Head: Normocephalic.      Right Ear: Tympanic membrane, ear canal and external ear normal.      Left Ear: Tympanic membrane, ear canal and external ear normal.      Mouth/Throat:      Mouth: Mucous membranes are moist.      Pharynx: Oropharynx is clear.   Eyes:      Extraocular Movements: Extraocular movements intact.      Conjunctiva/sclera: Conjunctivae normal.      Pupils: Pupils are equal, round, and reactive to light.   Neck:      Thyroid: No thyromegaly or thyroid tenderness.      Vascular: No carotid bruit.   Cardiovascular:      Rate and Rhythm: Normal rate and regular rhythm.      Pulses: Normal pulses.      Heart sounds: Normal heart sounds. No murmur heard.      Pulmonary:      Effort: Pulmonary effort is normal.      Breath sounds: Normal breath sounds. No wheezing.   Abdominal:      General: Bowel sounds are normal. There is no distension.      Palpations: Abdomen is soft.      Tenderness: There is no abdominal tenderness. There is no right CVA tenderness or left CVA tenderness.   Musculoskeletal:      Cervical back: Normal.      Thoracic back: Normal.      Lumbar back: Normal.      Comments: Full ROM of all major joints   Lymphadenopathy:      Cervical: No cervical adenopathy.   Skin:     General: Skin is warm and dry.      Capillary Refill: Capillary refill takes less than 2 seconds.   Neurological:      General: No focal deficit present.      Mental Status: He is alert and oriented to person, place, and time.     "  GCS: GCS eye subscore is 4. GCS verbal subscore is 5. GCS motor subscore is 6.      Motor: Motor function is intact.      Coordination: Coordination is intact.      Gait: Gait is intact.   Psychiatric:         Attention and Perception: Attention normal.         Mood and Affect: Mood normal.         Behavior: Behavior normal.         Thought Content: Thought content normal.         Judgment: Judgment normal.         Patient's Body mass index is 35.84 kg/m². indicating that he is obese (BMI >30). Obesity-related health conditions include the following: none. Obesity is newly identified. BMI is is above average; BMI management plan is completed. We discussed low calorie, low carb based diet program, portion control and increasing exercise..       Assessment/Plan     Current Outpatient Medications:   •  multivitamin with minerals tablet tablet, Take 1 tablet by mouth Daily., Disp: , Rfl:     Problem List Items Addressed This Visit        Endocrine and Metabolic    Vitamin D deficiency    Overview     Chronic issue requiring diet changes, monitoring, and dietary supplement. Will increase dietary intake of Vitamin D through dairy milk, plant/nut based milk, and fortified foods such as juice and cereal. Will start dietary supplement as directed.            Relevant Orders    Vitamin D 25 hydroxy      Other Visit Diagnoses     Routine physical examination    -  Primary    Relevant Orders    CBC w AUTO Differential    Comprehensive Metabolic Panel    Lipid Panel    Screening for prostate cancer        Relevant Orders    PSA Screen    Prediabetes        Relevant Orders    Hemoglobin A1c    Screening for colon cancer        Relevant Orders    Ambulatory Referral For Screening Colonoscopy    Class 2 obesity due to excess calories without serious comorbidity with body mass index (BMI) of 35.0 to 35.9 in adult             Update labs today  Schedule colonoscopy  Vaccines UTD  Discussed low calorie diet and weight loss, pt just  got a dog so he will start exercising more        Counseling was given to patient for the following topics: appropriate exercise, healthy eating habits, disease prevention, risk factors for cancer and importance of self testicular exam.     Plan of care reviewed with the patient at the conclusion of today's visit.  Education was provided regarding diagnosis, management, and any prescribed or recommended OTC medications.  Patient verbalized understanding of and agreement with management plan.     Return in about 1 year (around 1/5/2023) for Annual.         NAJMA Roe

## 2022-01-06 LAB — 25(OH)D3 SERPL-MCNC: 37.3 NG/ML

## 2022-01-11 DIAGNOSIS — Z12.11 ENCOUNTER FOR SCREENING COLONOSCOPY: Primary | ICD-10-CM

## 2022-01-14 DIAGNOSIS — J06.9 UPPER RESPIRATORY TRACT INFECTION, UNSPECIFIED TYPE: Primary | ICD-10-CM

## 2022-02-09 ENCOUNTER — OUTSIDE FACILITY SERVICE (OUTPATIENT)
Dept: GASTROENTEROLOGY | Facility: CLINIC | Age: 57
End: 2022-02-09

## 2022-02-09 PROCEDURE — 45378 DIAGNOSTIC COLONOSCOPY: CPT | Performed by: INTERNAL MEDICINE

## 2023-01-11 ENCOUNTER — LAB (OUTPATIENT)
Dept: LAB | Facility: HOSPITAL | Age: 58
End: 2023-01-11
Payer: COMMERCIAL

## 2023-01-11 ENCOUNTER — OFFICE VISIT (OUTPATIENT)
Dept: INTERNAL MEDICINE | Facility: CLINIC | Age: 58
End: 2023-01-11
Payer: COMMERCIAL

## 2023-01-11 VITALS
RESPIRATION RATE: 16 BRPM | SYSTOLIC BLOOD PRESSURE: 124 MMHG | HEIGHT: 71 IN | DIASTOLIC BLOOD PRESSURE: 78 MMHG | OXYGEN SATURATION: 98 % | TEMPERATURE: 97.8 F | HEART RATE: 71 BPM | WEIGHT: 244 LBS | BODY MASS INDEX: 34.16 KG/M2

## 2023-01-11 DIAGNOSIS — M72.2 PLANTAR FASCIITIS: ICD-10-CM

## 2023-01-11 DIAGNOSIS — R73.03 BORDERLINE DIABETES: Primary | ICD-10-CM

## 2023-01-11 DIAGNOSIS — Z13.0 SCREENING FOR BLOOD DISEASE: ICD-10-CM

## 2023-01-11 DIAGNOSIS — Z13.220 LIPID SCREENING: ICD-10-CM

## 2023-01-11 DIAGNOSIS — Z13.21 ENCOUNTER FOR VITAMIN DEFICIENCY SCREENING: ICD-10-CM

## 2023-01-11 DIAGNOSIS — Z12.5 SCREENING FOR PROSTATE CANCER: ICD-10-CM

## 2023-01-11 DIAGNOSIS — H10.33 ACUTE CONJUNCTIVITIS OF BOTH EYES, UNSPECIFIED ACUTE CONJUNCTIVITIS TYPE: ICD-10-CM

## 2023-01-11 DIAGNOSIS — Z13.29 THYROID DISORDER SCREENING: ICD-10-CM

## 2023-01-11 DIAGNOSIS — E55.9 VITAMIN D DEFICIENCY: ICD-10-CM

## 2023-01-11 DIAGNOSIS — E53.9 VITAMIN B DEFICIENCY: ICD-10-CM

## 2023-01-11 DIAGNOSIS — J98.8 RESPIRATORY INFECTION: ICD-10-CM

## 2023-01-11 DIAGNOSIS — Z11.59 ENCOUNTER FOR HEPATITIS C SCREENING TEST FOR LOW RISK PATIENT: ICD-10-CM

## 2023-01-11 LAB — HBA1C MFR BLD: 5.7 %

## 2023-01-11 PROCEDURE — 83036 HEMOGLOBIN GLYCOSYLATED A1C: CPT | Performed by: NURSE PRACTITIONER

## 2023-01-11 PROCEDURE — 99214 OFFICE O/P EST MOD 30 MIN: CPT | Performed by: NURSE PRACTITIONER

## 2023-01-11 RX ORDER — POLYMYXIN B SULFATE AND TRIMETHOPRIM 1; 10000 MG/ML; [USP'U]/ML
1 SOLUTION OPHTHALMIC 4 TIMES DAILY
Qty: 10 ML | Refills: 0 | Status: SHIPPED | OUTPATIENT
Start: 2023-01-11 | End: 2023-01-18

## 2023-01-11 RX ORDER — AMOXICILLIN AND CLAVULANATE POTASSIUM 875; 125 MG/1; MG/1
1 TABLET, FILM COATED ORAL 2 TIMES DAILY
Qty: 20 TABLET | Refills: 0 | Status: SHIPPED | OUTPATIENT
Start: 2023-01-11 | End: 2023-01-21

## 2023-01-11 NOTE — PROGRESS NOTES
Subjective   Chief Complaint   Patient presents with   • Conjunctivitis     R eye   • Diabetes   • Pain      Tony Butler is a 57 y.o. male.     The patient is here today for prediabetes and reports right eye conjunctivitis.    Prediabetes  The patient's hemoglobin A1c has improved today at 5.7 percent. One year ago, his hemoglobin A1c was 6.16 percent. The patient reports that he has stopped drinking soda and started drinking zero sugar drinks. He states that he still eats junk food, pastries, and milk at night.    Right eye conjunctivitis  The patient states that the symptoms began in his left eye then moved into his right eye the next day.    Bilateral heel pain  The patient reports that he has been experiencingpain in his heels for a few months. He states that at one point they were so sore that he could not step on his heels. He admits that he was trying to compensate for the pain. He states that he has calluses on his feet. He denies having a history of podiatry issues. He admits that he used to run 5 miles daily. He notes that he walks 2 to 3 miles daily at his job.    Cough  The patient reports that he can go throughout the entire day without coughing. He adds that he wakes up and has to cough up phlem occassionally that last for approximately an hour to 1.5 hours.    Vitamin D and B deficiency  The patient reports that he takes a multivitamin.    Health maintenance  The patient reports that he has had his COVID-19 vaccine, shingles vaccine, and influenza vaccine. He states that he may have had a pneumonia vaccine. He adds that he had a prostate exam 1.5 years ago.    I have reviewed the following portions of the patient's history and confirmed they are accurate: allergies, current medications, past family history, past medical history, past social history, past surgical history, and problem list    I have personally completed the patient's review of systems.    Review of Systems   Constitutional: Negative  "for activity change, appetite change, chills, diaphoresis, fatigue, fever, unexpected weight gain and unexpected weight loss.   HENT: Positive for congestion. Negative for ear discharge, ear pain, mouth sores, nosebleeds, sinus pressure, sneezing and sore throat.    Eyes: Positive for discharge and redness. Negative for pain and itching.   Respiratory: Positive for cough. Negative for chest tightness, shortness of breath and wheezing.    Cardiovascular: Negative for chest pain, palpitations and leg swelling.   Gastrointestinal: Negative for abdominal pain, constipation, diarrhea, nausea and vomiting.   Endocrine: Negative for heat intolerance, polydipsia and polyphagia.   Genitourinary: Negative for dysuria, flank pain, frequency, hematuria and urgency.   Musculoskeletal: Positive for arthralgias. Negative for back pain, gait problem, joint swelling, myalgias, neck pain and neck stiffness.   Skin: Negative for color change, pallor and rash.   Allergic/Immunologic: Negative for immunocompromised state.   Neurological: Negative for seizures, speech difficulty, weakness and numbness.   Hematological: Negative for adenopathy.   Psychiatric/Behavioral: Negative for agitation, decreased concentration, sleep disturbance, suicidal ideas and depressed mood. The patient is not nervous/anxious.        Current Outpatient Medications on File Prior to Visit   Medication Sig   • multivitamin with minerals tablet tablet Take 1 tablet by mouth Daily.     No current facility-administered medications on file prior to visit.       Objective   Vitals:    01/11/23 0938   BP: 124/78   Pulse: 71   Resp: 16   Temp: 97.8 °F (36.6 °C)   TempSrc: Tympanic   SpO2: 98%   Weight: 111 kg (244 lb)   Height: 180.3 cm (71\")     Body mass index is 34.03 kg/m².    Physical Exam  Vitals reviewed.   Constitutional:       Appearance: Normal appearance. He is well-developed.   HENT:      Head: Normocephalic and atraumatic.      Nose: Nose normal.   Eyes: "      General: Lids are normal.      Conjunctiva/sclera:      Right eye: Right conjunctiva is injected. Exudate present.      Left eye: Left conjunctiva is injected. Exudate present.      Pupils: Pupils are equal, round, and reactive to light.   Neck:      Thyroid: No thyromegaly.      Trachea: Trachea normal.   Cardiovascular:      Rate and Rhythm: Normal rate and regular rhythm.      Heart sounds: Normal heart sounds.   Pulmonary:      Effort: Pulmonary effort is normal. No respiratory distress.      Breath sounds: Normal breath sounds.      Comments: Loose cough  Skin:     General: Skin is warm and dry.   Neurological:      Mental Status: He is alert and oriented to person, place, and time.      GCS: GCS eye subscore is 4. GCS verbal subscore is 5. GCS motor subscore is 6.   Psychiatric:         Attention and Perception: Attention normal.         Mood and Affect: Mood normal.         Speech: Speech normal.         Behavior: Behavior normal. Behavior is cooperative.         Thought Content: Thought content normal.         Assessment & Plan   Problem List Items Addressed This Visit        Endocrine and Metabolic    Vitamin B deficiency    Relevant Orders    Vitamin B12 & Folate    Vitamin D deficiency    Relevant Orders    Vitamin D,25-Hydroxy    Borderline diabetes - Primary    Relevant Orders    POCT glycated hemoglobin, total (Completed)       Musculoskeletal and Injuries    Plantar fasciitis   Other Visit Diagnoses     Respiratory infection        Relevant Medications    amoxicillin-clavulanate (Augmentin) 875-125 MG per tablet    Acute conjunctivitis of both eyes, unspecified acute conjunctivitis type        Relevant Medications    amoxicillin-clavulanate (Augmentin) 875-125 MG per tablet    trimethoprim-polymyxin b (POLYTRIM) 28460-3.1 UNIT/ML-% ophthalmic solution    Screening for blood disease        Relevant Orders    CBC (No Diff)    Comprehensive Metabolic Panel    Lipid Panel    TSH Rfx On Abnormal To  Free T4    Vitamin B12 & Folate    Vitamin D,25-Hydroxy    Hepatitis C Antibody    PSA Screen    Thyroid disorder screening        Relevant Orders    TSH Rfx On Abnormal To Free T4    Lipid screening        Relevant Orders    Lipid Panel    Encounter for vitamin deficiency screening        Relevant Orders    Vitamin B12 & Folate    Vitamin D,25-Hydroxy    Encounter for hepatitis C screening test for low risk patient        Relevant Orders    Hepatitis C Antibody    Screening for prostate cancer        Relevant Orders    PSA Screen         PLAN  Borderline diabetes-Chronic, stable.   The patient will continue managing with diet and lifestyle changes. Hemoglobin A1c will be checked on patient's next follow-up.    Respiratory infection- New, unstable.   The patient will start Augmentin.    Acute conjunctivitis of both eyes- New, unstable.   The patient will start Augmentin and Polytrim eyedrops.    Plantar fasciitis- New, unstable.  I discussed plantar fasciitis stretches and using more supportive shoes such as Fowler shoes. If no improvement in symptoms, suggest getting bilateral foot x-rays and referring to podiatry for consult.    Vitamin D deficiency- Chronic, stable.   The patient will continue vitamin D supplement. Rechecking vitamin D labs.    Vitamin B deficiency- Chronic, stable.   The patient will continue vitamin B supplement. Repeating vitamin B labs.      Current Outpatient Medications:   •  multivitamin with minerals tablet tablet, Take 1 tablet by mouth Daily., Disp: , Rfl:   •  amoxicillin-clavulanate (Augmentin) 875-125 MG per tablet, Take 1 tablet by mouth 2 (Two) Times a Day for 10 days. Take with food., Disp: 20 tablet, Rfl: 0  •  trimethoprim-polymyxin b (POLYTRIM) 20353-7.1 UNIT/ML-% ophthalmic solution, Administer 1 drop to both eyes 4 (Four) Times a Day for 7 days., Disp: 10 mL, Rfl: 0       Plan of care reviewed with the patient at the conclusion of today's visit.  Education was provided  regarding diagnosis, management, and any prescribed or recommended OTC medications.  Patient verbalized understanding of and agreement with management plan.     Return in 6 months (on 7/11/2023), or if symptoms worsen or fail to improve, for Annual, Follow-up.      Transcribed from ambient dictation for NAJMA Jeo by Inga Colindres.  01/11/23   12:17 EST    Patient or patient representative verbalized consent to the visit recording.  I have personally performed the services described in this document as transcribed by the above individual, and it is both accurate and complete.  Inga Colindres

## 2024-01-23 ENCOUNTER — TELEPHONE (OUTPATIENT)
Dept: INTERNAL MEDICINE | Facility: CLINIC | Age: 59
End: 2024-01-23
Payer: COMMERCIAL

## 2024-01-23 NOTE — TELEPHONE ENCOUNTER
Caller: BRADFORD CESARIO    Relationship: Emergency Contact    Best call back number: [phone]     What form or medical record are you requesting: [facility] PAPERWORK    Who is requesting this form or medical record from you: EMPLOYER    How would you like to receive the form or medical records (pick-up, mail, fax):     Timeframe paperwork needed: ASAP    Additional notes: FMLA IS NEEDED TO HELP THE SIBLINGS SHARE RESPONSIBILITIES TAKING CARE OF THEIR MOTHER WITH [employer] . PATIENT DROPPED OFF FORMS YESTERDAY. PATIENT FOUND OUT FORMS NEED TO BE COMPLETED AND BROUGHT IN TO HIS EMPLOYER.    PLEASE CONTACT ONCE THE [facility] PAPERWORK IS COMPLETED AND READY FOR !

## 2024-01-23 NOTE — TELEPHONE ENCOUNTER
Name: CESARIO BRADFORD      Relationship: Emergency Contact      Best Callback Number: [phone]      HUB PROVIDED THE RELAY MESSAGE FROM THE OFFICE      PATIENT: VOICED UNDERSTANDING AND HAS NO FURTHER QUESTIONS AT THIS TIME    ADDITIONAL INFORMATION:

## 2024-01-23 NOTE — TELEPHONE ENCOUNTER
Paperwork just dropped off yesterday without an appt. It will be end of week before it is finished. We will contact patient when complete.

## 2024-01-26 ENCOUNTER — TELEPHONE (OUTPATIENT)
Dept: INTERNAL MEDICINE | Facility: CLINIC | Age: 59
End: 2024-01-26
Payer: COMMERCIAL

## 2024-01-26 NOTE — TELEPHONE ENCOUNTER
I've reviewed it and trying to fill it out. Please contact them and explain that there are many questions I have including dates to be filled in. I have not seen patient in over a year anyway. Can you please ask them if Tony can be put in my 12:45 spot on Tuesday. I will get paperwork done in the appt, fax on his way out, and he will have original copy. We can do his follow up during this and get it all done. If tthat spot doesn't work I can find a different place to work him in so they have the paperwork before the 1st. Thanks.

## 2024-01-26 NOTE — TELEPHONE ENCOUNTER
Caller: BRADFORDCESARIO    Relationship: Emergency Contact    Best call back number: [phone]     What is the best time to reach you: ANYTIME    Who are you requesting to speak with (clinical staff, provider,  specific staff member): PCP/MA    Do you know the name of the person who called: CESARIO    What was the call regarding: PATIENTS SISTER IS CHECKING STATUS ON FMLA PAPERWORK. SHE STATED SHE HAD GOTTEN A MESSAGE IT WOULD BE DONE BY END OF THE WEEK. REQUEST CALLBACK TO SEE IF IT IS AVAILABLE  OR WHEN IT MAY BE. DEADLINE IS 2/1/24 FOR THEM TO TURN IT IN    Is it okay if the provider responds through MyChart: CALLBACK TODAY

## 2024-01-30 ENCOUNTER — OFFICE VISIT (OUTPATIENT)
Dept: INTERNAL MEDICINE | Facility: CLINIC | Age: 59
End: 2024-01-30
Payer: COMMERCIAL

## 2024-01-30 VITALS
WEIGHT: 247 LBS | SYSTOLIC BLOOD PRESSURE: 132 MMHG | BODY MASS INDEX: 34.58 KG/M2 | OXYGEN SATURATION: 99 % | DIASTOLIC BLOOD PRESSURE: 82 MMHG | HEART RATE: 72 BPM | HEIGHT: 71 IN

## 2024-01-30 DIAGNOSIS — R73.03 BORDERLINE DIABETES: Primary | ICD-10-CM

## 2024-01-30 DIAGNOSIS — E55.9 VITAMIN D DEFICIENCY: ICD-10-CM

## 2024-01-30 DIAGNOSIS — Z11.59 NEED FOR HEPATITIS C SCREENING TEST: ICD-10-CM

## 2024-01-30 DIAGNOSIS — E78.00 HYPERCHOLESTEREMIA: ICD-10-CM

## 2024-01-30 DIAGNOSIS — Z12.5 SCREENING FOR MALIGNANT NEOPLASM OF PROSTATE: ICD-10-CM

## 2024-01-30 DIAGNOSIS — E53.9 VITAMIN B DEFICIENCY: ICD-10-CM

## 2024-01-30 PROCEDURE — 99214 OFFICE O/P EST MOD 30 MIN: CPT | Performed by: NURSE PRACTITIONER

## 2024-01-31 ENCOUNTER — LAB (OUTPATIENT)
Dept: INTERNAL MEDICINE | Facility: CLINIC | Age: 59
End: 2024-01-31
Payer: COMMERCIAL

## 2024-01-31 DIAGNOSIS — R73.03 BORDERLINE DIABETES: Primary | ICD-10-CM

## 2024-01-31 LAB
ALBUMIN SERPL-MCNC: 4 G/DL (ref 3.5–5.2)
ALBUMIN/GLOB SERPL: 1.5 G/DL
ALP SERPL-CCNC: 64 U/L (ref 39–117)
ALT SERPL W P-5'-P-CCNC: 19 U/L (ref 1–41)
ANION GAP SERPL CALCULATED.3IONS-SCNC: 10 MMOL/L (ref 5–15)
AST SERPL-CCNC: 21 U/L (ref 1–40)
BILIRUB SERPL-MCNC: 0.3 MG/DL (ref 0–1.2)
BUN SERPL-MCNC: 19 MG/DL (ref 8–23)
BUN/CREAT SERPL: 17.4 (ref 7–25)
CALCIUM SPEC-SCNC: 9.2 MG/DL (ref 8.6–10.5)
CHLORIDE SERPL-SCNC: 102 MMOL/L (ref 98–107)
CHOLEST SERPL-MCNC: 155 MG/DL (ref 0–200)
CO2 SERPL-SCNC: 27 MMOL/L (ref 22–29)
CREAT SERPL-MCNC: 1.09 MG/DL (ref 0.76–1.27)
DEPRECATED RDW RBC AUTO: 39.6 FL (ref 37–54)
EGFRCR SERPLBLD CKD-EPI 2021: 77.2 ML/MIN/1.73
ERYTHROCYTE [DISTWIDTH] IN BLOOD BY AUTOMATED COUNT: 13 % (ref 12.3–15.4)
GLOBULIN UR ELPH-MCNC: 2.7 GM/DL
GLUCOSE SERPL-MCNC: 67 MG/DL (ref 65–99)
HBA1C MFR BLD: 6.3 % (ref 4.8–5.6)
HCT VFR BLD AUTO: 44.9 % (ref 37.5–51)
HDLC SERPL-MCNC: 42 MG/DL (ref 40–60)
HGB BLD-MCNC: 14.9 G/DL (ref 13–17.7)
LDLC SERPL CALC-MCNC: 101 MG/DL (ref 0–100)
LDLC/HDLC SERPL: 2.41 {RATIO}
MCH RBC QN AUTO: 28.5 PG (ref 26.6–33)
MCHC RBC AUTO-ENTMCNC: 33.2 G/DL (ref 31.5–35.7)
MCV RBC AUTO: 85.9 FL (ref 79–97)
PLATELET # BLD AUTO: 232 10*3/MM3 (ref 140–450)
PMV BLD AUTO: 10.5 FL (ref 6–12)
POTASSIUM SERPL-SCNC: 3.9 MMOL/L (ref 3.5–5.2)
PROT SERPL-MCNC: 6.7 G/DL (ref 6–8.5)
PSA SERPL-MCNC: 0.79 NG/ML (ref 0–4)
RBC # BLD AUTO: 5.23 10*6/MM3 (ref 4.14–5.8)
SODIUM SERPL-SCNC: 139 MMOL/L (ref 136–145)
TRIGL SERPL-MCNC: 58 MG/DL (ref 0–150)
TSH SERPL DL<=0.05 MIU/L-ACNC: 2.09 UIU/ML (ref 0.27–4.2)
VLDLC SERPL-MCNC: 12 MG/DL (ref 5–40)
WBC NRBC COR # BLD AUTO: 5.49 10*3/MM3 (ref 3.4–10.8)

## 2024-01-31 PROCEDURE — 86803 HEPATITIS C AB TEST: CPT | Performed by: NURSE PRACTITIONER

## 2024-01-31 PROCEDURE — 82607 VITAMIN B-12: CPT | Performed by: NURSE PRACTITIONER

## 2024-01-31 PROCEDURE — 36415 COLL VENOUS BLD VENIPUNCTURE: CPT | Performed by: NURSE PRACTITIONER

## 2024-01-31 PROCEDURE — 80050 GENERAL HEALTH PANEL: CPT | Performed by: NURSE PRACTITIONER

## 2024-01-31 PROCEDURE — 80061 LIPID PANEL: CPT | Performed by: NURSE PRACTITIONER

## 2024-01-31 PROCEDURE — 82746 ASSAY OF FOLIC ACID SERUM: CPT | Performed by: NURSE PRACTITIONER

## 2024-01-31 PROCEDURE — 82306 VITAMIN D 25 HYDROXY: CPT | Performed by: NURSE PRACTITIONER

## 2024-01-31 PROCEDURE — 83036 HEMOGLOBIN GLYCOSYLATED A1C: CPT | Performed by: NURSE PRACTITIONER

## 2024-01-31 PROCEDURE — G0103 PSA SCREENING: HCPCS | Performed by: NURSE PRACTITIONER

## 2024-02-01 LAB
25(OH)D3 SERPL-MCNC: 37.3 NG/ML (ref 30–100)
FOLATE SERPL-MCNC: 11.7 NG/ML (ref 4.78–24.2)
HCV AB SER DONR QL: NORMAL
VIT B12 BLD-MCNC: 380 PG/ML (ref 211–946)

## 2024-08-12 ENCOUNTER — OFFICE VISIT (OUTPATIENT)
Dept: INTERNAL MEDICINE | Facility: CLINIC | Age: 59
End: 2024-08-12
Payer: COMMERCIAL

## 2024-08-12 VITALS
WEIGHT: 247.8 LBS | HEART RATE: 95 BPM | TEMPERATURE: 97.3 F | BODY MASS INDEX: 34.69 KG/M2 | DIASTOLIC BLOOD PRESSURE: 84 MMHG | HEIGHT: 71 IN | SYSTOLIC BLOOD PRESSURE: 112 MMHG

## 2024-08-12 DIAGNOSIS — E78.00 HYPERCHOLESTEREMIA: ICD-10-CM

## 2024-08-12 DIAGNOSIS — E55.9 VITAMIN D DEFICIENCY: ICD-10-CM

## 2024-08-12 DIAGNOSIS — E66.9 CLASS 1 OBESITY WITH SERIOUS COMORBIDITY AND BODY MASS INDEX (BMI) OF 34.0 TO 34.9 IN ADULT, UNSPECIFIED OBESITY TYPE: ICD-10-CM

## 2024-08-12 DIAGNOSIS — E53.9 VITAMIN B DEFICIENCY: ICD-10-CM

## 2024-08-12 DIAGNOSIS — R73.03 BORDERLINE DIABETES: Primary | ICD-10-CM

## 2024-08-12 PROBLEM — M54.32 SCIATICA OF LEFT SIDE: Status: ACTIVE | Noted: 2024-08-12

## 2024-08-12 LAB
ALBUMIN UR-MCNC: <1.2 MG/DL
CREAT UR-MCNC: 184 MG/DL
EXPIRATION DATE: NORMAL
HBA1C MFR BLD: 5.7 % (ref 4.5–5.7)
Lab: NORMAL
MICROALBUMIN/CREAT UR: NORMAL MG/G{CREAT}

## 2024-08-12 PROCEDURE — 82043 UR ALBUMIN QUANTITATIVE: CPT | Performed by: NURSE PRACTITIONER

## 2024-08-12 PROCEDURE — 82570 ASSAY OF URINE CREATININE: CPT | Performed by: NURSE PRACTITIONER

## 2024-08-12 PROCEDURE — 99214 OFFICE O/P EST MOD 30 MIN: CPT | Performed by: NURSE PRACTITIONER

## 2024-08-12 PROCEDURE — 83036 HEMOGLOBIN GLYCOSYLATED A1C: CPT | Performed by: NURSE PRACTITIONER

## 2024-08-12 NOTE — PROGRESS NOTES
"Subjective   Chief Complaint   Patient presents with    Borderline diabetes        Tony Butler is a 59 y.o. male.    History of Present Illness  The patient presents for evaluation of multiple medical concerns.    He reports experiencing mild numbness in his leg, which he was informed might be due to sciatica. The numbness is not painful and is only noticeable upon touch.    He is considering Ozempic as a potential aid in weight loss. His current weight is 245 pounds and height is 5 feet 11 inches, with a target weight of 200 pounds. He has been making efforts to increase his physical activity and has lost some weight. His diet remains unchanged, but he occasionally consumes greasy foods. He feels this medication will greatly help his appetite. Has family history of diabetes. His A1c was 6.3 about 6 months ago but has improved at 5.7 today. He has no history of pancreatitis or gallbladder issues. Last lipid panel, vitamin D and B12 labs improved.     SOCIAL HISTORY  He does not drink alcohol.    FAMILY HISTORY  He has a family history of diabetes. No family history of thyroid cancer.     I have reviewed the following portions of the patient's history and confirmed they are accurate: allergies, current medications, past family history, past medical history, past social history, past surgical history, and problem list    Review of Systems  Pertinent items are noted in HPI.     Current Outpatient Medications on File Prior to Visit   Medication Sig    multivitamin with minerals tablet tablet Take 1 tablet by mouth Daily.     No current facility-administered medications on file prior to visit.       Objective   Vitals:    08/12/24 1440   BP: 112/84   BP Location: Left arm   Patient Position: Sitting   Cuff Size: Large Adult   Pulse: 95   Temp: 97.3 °F (36.3 °C)   Weight: 112 kg (247 lb 12.8 oz)   Height: 180.3 cm (70.98\")     Body mass index is 34.58 kg/m².    Physical Exam  Vitals reviewed.   Constitutional:       " Appearance: Normal appearance. He is well-developed.   HENT:      Head: Normocephalic and atraumatic.      Nose: Nose normal.   Eyes:      General: Lids are normal.      Conjunctiva/sclera: Conjunctivae normal.      Pupils: Pupils are equal, round, and reactive to light.   Neck:      Thyroid: No thyromegaly.      Trachea: Trachea normal.   Cardiovascular:      Rate and Rhythm: Normal rate and regular rhythm.      Heart sounds: Normal heart sounds.   Pulmonary:      Effort: Pulmonary effort is normal. No respiratory distress.      Breath sounds: Normal breath sounds.   Skin:     General: Skin is warm and dry.   Neurological:      Mental Status: He is alert and oriented to person, place, and time.      GCS: GCS eye subscore is 4. GCS verbal subscore is 5. GCS motor subscore is 6.   Psychiatric:         Attention and Perception: Attention normal.         Mood and Affect: Mood normal.         Speech: Speech normal.         Behavior: Behavior normal. Behavior is cooperative.         Thought Content: Thought content normal.         Results  Laboratory Studies  A1c is 5.7.    Assessment & Plan   Problem List Items Addressed This Visit       Vitamin B deficiency    Relevant Orders    CBC (No Diff)    Vitamin B12 & Folate    Vitamin D deficiency    Relevant Orders    Comprehensive Metabolic Panel    Vitamin D,25-Hydroxy    Borderline diabetes - Primary    Relevant Orders    POC Glycosylated Hemoglobin (Hb A1C) (Completed)    CBC (No Diff)    Comprehensive Metabolic Panel    Microalbumin / Creatinine Urine Ratio - Urine, Clean Catch (Completed)     Other Visit Diagnoses       Hypercholesteremia        Relevant Orders    CBC (No Diff)    Comprehensive Metabolic Panel    Lipid Panel    Class 1 obesity with serious comorbidity and body mass index (BMI) of 34.0 to 34.9 in adult, unspecified obesity type        Relevant Medications    Semaglutide-Weight Management 0.25 MG/0.5ML solution auto-injector               Current  Outpatient Medications:     multivitamin with minerals tablet tablet, Take 1 tablet by mouth Daily., Disp: , Rfl:     Semaglutide-Weight Management 0.25 MG/0.5ML solution auto-injector, Inject 0.5 mL under the skin into the appropriate area as directed 1 (One) Time Per Week., Disp: 2 mL, Rfl: 0    Assessment & Plan  1. Borderline diabetes.  This is a chronic and stable condition. He will continue with a low-carb, low-sugar diet. His A1c will be rechecked in 3 months.    2. Hypercholesterolemia.  This is a chronic and stable condition. He will continue with a heart-healthy, low-cholesterol, high-fiber diet. He will return for fasting lipid panel and CMP.    3. Class 1 obesity.  This is a chronic unstable condition. He denies a history of pancreatitis, gallbladder problems, or family history of thyroid cancers. He will start Wegovy. He will follow a low-calorie diet. Educated him on risks and side effects of taking Wegovy. He will increase physical activity as tolerated.    4. Vitamin B and D deficiencies.  This is a chronic and stable condition. He will continue his multivitamin and supplements. His vitamin D and B12 labs will be rechecked in 1 month.    Follow-up  He will follow up in 1 month after starting the medication.         Plan of care reviewed with the patient at the conclusion of today's visit.  Education was provided regarding diagnosis, management, and any prescribed or recommended OTC medications.  Patient verbalized understanding of and agreement with management plan.     Return in about 1 month (around 9/12/2024), or if symptoms worsen or fail to improve, for Follow-up.      Transcribed from ambient dictation for NAJMA Joe by NAJMA Joe.  08/12/24   15:10 EDT    Patient or patient representative verbalized consent for the use of Ambient Listening during the visit with  NAJMA Joe for chart documentation. 8/18/2024  23:38 EDT

## 2024-09-04 ENCOUNTER — TELEPHONE (OUTPATIENT)
Dept: INTERNAL MEDICINE | Facility: CLINIC | Age: 59
End: 2024-09-04
Payer: COMMERCIAL

## 2024-09-04 DIAGNOSIS — E66.9 CLASS 1 OBESITY WITH SERIOUS COMORBIDITY AND BODY MASS INDEX (BMI) OF 34.0 TO 34.9 IN ADULT, UNSPECIFIED OBESITY TYPE: ICD-10-CM

## 2024-09-04 NOTE — TELEPHONE ENCOUNTER
Yes he will need to come by and sign consent form before it can be sent. I have already spoken to him about side effects. I want to see him back in one month after starting it. The front will need to let me know after he signs the consent and I will send it to [facility].

## 2024-09-04 NOTE — TELEPHONE ENCOUNTER
Caller: CESARIO BRADFORD    Relationship: Emergency Contact    Best call back number:   [phone] (Mobile)     Requested Prescriptions:   Requested Prescriptions      No prescriptions requested or ordered in this encounter    COMPOUND Semaglutide-Weight Management 0.25 MG/0.5ML solution auto-injector   PHARMACY TOLD CESARIO IT HAS TO SAY COMPOUND     Pharmacy where request should be sent: [facility] [place] [address] [address] [facility] [address] - [phone] PH - [phone] FX     Last office visit with prescribing clinician: 8/12/2024   Last telemedicine visit with prescribing clinician: Visit date not found   Next office visit with prescribing clinician: Visit date not found     Additional details provided by patient:   CESARIO SAID THEY HAVE LOOKED AT DIFFERENT PHARMACIES AND THEY COULD NOT GET IT BUT SHE SAID THE [facility] CAN FILL IT IF IT SAYS COMPOUND

## 2024-09-04 NOTE — TELEPHONE ENCOUNTER
Does patient need to sign the form for compound? I dont see one scanned in but didn't know if one was already signed and not scanned in yet.

## 2024-09-05 DIAGNOSIS — E66.9 CLASS 1 OBESITY WITH SERIOUS COMORBIDITY AND BODY MASS INDEX (BMI) OF 34.0 TO 34.9 IN ADULT, UNSPECIFIED OBESITY TYPE: Primary | ICD-10-CM

## 2024-09-05 RX ORDER — SEMAGLUTIDE 1.34 MG/ML
0.25 INJECTION, SOLUTION SUBCUTANEOUS WEEKLY
Qty: 1.5 ML | Refills: 1 | Status: SHIPPED | OUTPATIENT
Start: 2024-09-05

## 2024-09-05 NOTE — TELEPHONE ENCOUNTER
Ilene called to follow up about the prescription and she was advised that provider needs patient to come in and sign a consent form prior to being prescribed the compound.

## 2024-09-30 ENCOUNTER — TELEPHONE (OUTPATIENT)
Dept: INTERNAL MEDICINE | Facility: CLINIC | Age: 59
End: 2024-09-30
Payer: COMMERCIAL

## 2024-09-30 NOTE — TELEPHONE ENCOUNTER
Caller: CESARIO BRADFORD    Relationship: Emergency Contact    Best call back number: [phone]     What medication are you requesting: SEMAGLUTIDE      If a prescription is needed, what is your preferred pharmacy and phone number: [facility] - [place], [place] - [address] [facility] [address] - [phone]  - [phone] FX     Additional notes: PATIENT IS DUE ON FRIDAY FOR HIS NEXT DOSE. HE WOULD LIKE TO INCREASE TO THE NEXT STRENGTH. PLEASE CALL PATIENT WHEN THE MEDICATION HAS BEEN SENT TO THE PHARMACY

## 2024-10-01 NOTE — TELEPHONE ENCOUNTER
This is new prescription and he has to be seen for follow up to increase dose due to the side effects of taking the medication. He has to be seen monthly for weight check and follow up on how he's doing until he gets to dose he wants to stay at long term.

## 2024-10-04 ENCOUNTER — OFFICE VISIT (OUTPATIENT)
Dept: INTERNAL MEDICINE | Facility: CLINIC | Age: 59
End: 2024-10-04
Payer: COMMERCIAL

## 2024-10-04 VITALS
SYSTOLIC BLOOD PRESSURE: 128 MMHG | HEIGHT: 71 IN | BODY MASS INDEX: 33.88 KG/M2 | DIASTOLIC BLOOD PRESSURE: 68 MMHG | HEART RATE: 85 BPM | OXYGEN SATURATION: 96 % | WEIGHT: 242 LBS

## 2024-10-04 DIAGNOSIS — E66.811 CLASS 1 OBESITY WITH SERIOUS COMORBIDITY AND BODY MASS INDEX (BMI) OF 33.0 TO 33.9 IN ADULT, UNSPECIFIED OBESITY TYPE: Primary | ICD-10-CM

## 2024-10-04 DIAGNOSIS — R73.03 BORDERLINE DIABETES: ICD-10-CM

## 2024-10-04 DIAGNOSIS — E78.00 HYPERCHOLESTEREMIA: ICD-10-CM

## 2024-10-04 PROCEDURE — 99214 OFFICE O/P EST MOD 30 MIN: CPT | Performed by: NURSE PRACTITIONER

## 2024-10-04 RX ORDER — SEMAGLUTIDE 1.34 MG/ML
0.5 INJECTION, SOLUTION SUBCUTANEOUS WEEKLY
Qty: 1.5 ML | Refills: 1 | Status: SHIPPED | OUTPATIENT
Start: 2024-10-04

## 2024-10-04 NOTE — PROGRESS NOTES
"Subjective   Chief Complaint   Patient presents with    Follow-up     Medications          Tony Butler is a 59 y.o. male.    History of Present Illness  The patient presents for weight management.    He reports that the semaglutide injection has not had a significant impact on his weight. His current weight is 242 pounds, with a goal to reduce it to between 200 and 210 pounds. He plans to discontinue the use of the injection after achieving this target.    He has previously lost weight through increased physical activity and dietary modifications. He has already lost 6 pounds and has made changes to his diet, including eliminating sodas and reducing portion sizes. He plans to increase his exercise regimen and will consult if he notices any weight gain.      He reports no abdominal pain, nausea, vomiting, constipation, or diarrhea. He has been using a stool softener but does not have diarrhea.    Last A1c and lipid panel had improved.     I have reviewed the following portions of the patient's history and confirmed they are accurate: allergies, current medications, past family history, past medical history, past social history, past surgical history, and problem list    Review of Systems  Pertinent items are noted in HPI.     Current Outpatient Medications on File Prior to Visit   Medication Sig    multivitamin with minerals tablet tablet Take 1 tablet by mouth Daily.    [DISCONTINUED] Semaglutide,0.25 or 0.5MG/DOS, (Ozempic, 0.25 or 0.5 MG/DOSE,) 2 MG/1.5ML solution pen-injector Inject 0.25 mg under the skin into the appropriate area as directed 1 (One) Time Per Week.     No current facility-administered medications on file prior to visit.       Objective   Vitals:    10/04/24 1452   BP: 128/68   BP Location: Left arm   Patient Position: Sitting   Pulse: 85   SpO2: 96%   Weight: 110 kg (242 lb)   Height: 180.3 cm (70.98\")     Body mass index is 33.77 kg/m².    Physical Exam  Vitals reviewed.   Constitutional:      "  Appearance: Normal appearance. He is well-developed.   HENT:      Head: Normocephalic and atraumatic.      Nose: Nose normal.   Eyes:      General: Lids are normal.      Conjunctiva/sclera: Conjunctivae normal.      Pupils: Pupils are equal, round, and reactive to light.   Neck:      Thyroid: No thyromegaly.      Trachea: Trachea normal.   Pulmonary:      Effort: Pulmonary effort is normal. No respiratory distress.   Skin:     General: Skin is warm and dry.   Neurological:      Mental Status: He is alert and oriented to person, place, and time.      GCS: GCS eye subscore is 4. GCS verbal subscore is 5. GCS motor subscore is 6.   Psychiatric:         Attention and Perception: Attention normal.         Mood and Affect: Mood normal.         Speech: Speech normal.         Behavior: Behavior normal. Behavior is cooperative.         Results  Lab Results   Component Value Date    HGBA1C 5.7 08/12/2024     Lab Results   Component Value Date    CHOL 155 01/31/2024    TRIG 58 01/31/2024    HDL 42 01/31/2024     (H) 01/31/2024     Lab Results   Component Value Date    GLUCOSE 67 01/31/2024    BUN 19 01/31/2024    CREATININE 1.09 01/31/2024     01/31/2024    K 3.9 01/31/2024     01/31/2024    CALCIUM 9.2 01/31/2024    PROTEINTOT 6.7 01/31/2024    ALBUMIN 4.0 01/31/2024    ALT 19 01/31/2024    AST 21 01/31/2024    ALKPHOS 64 01/31/2024    BILITOT 0.3 01/31/2024    GLOB 2.7 01/31/2024    AGRATIO 1.5 01/31/2024    BCR 17.4 01/31/2024    ANIONGAP 10.0 01/31/2024    EGFR 77.2 01/31/2024         Assessment & Plan   Problem List Items Addressed This Visit       Borderline diabetes - Primary    Relevant Medications    Semaglutide,0.25 or 0.5MG/DOS, (Ozempic, 0.25 or 0.5 MG/DOSE,) 2 MG/1.5ML solution pen-injector     Other Visit Diagnoses       Class 1 obesity with serious comorbidity and body mass index (BMI) of 33.0 to 33.9 in adult, unspecified obesity type        Relevant Medications    Semaglutide,0.25 or  0.5MG/DOS, (Ozempic, 0.25 or 0.5 MG/DOSE,) 2 MG/1.5ML solution pen-injector    Hypercholesteremia                   Current Outpatient Medications:     multivitamin with minerals tablet tablet, Take 1 tablet by mouth Daily., Disp: , Rfl:     Semaglutide,0.25 or 0.5MG/DOS, (Ozempic, 0.25 or 0.5 MG/DOSE,) 2 MG/1.5ML solution pen-injector, Inject 0.5 mg under the skin into the appropriate area as directed 1 (One) Time Per Week., Disp: 1.5 mL, Rfl: 1    Assessment & Plan  Increase semaglutide to 0.5 mg weekly.  Follow low-carb, low sugar, low-cholesterol, and high-fiber diet.  Patient will come back for fasting lipid panel, CMP.  Recheck A1c at next follow-up.        Follow-up  Return in 2 months for follow up.         Plan of care reviewed with the patient at the conclusion of today's visit.  Education was provided regarding diagnosis, management, and any prescribed or recommended OTC medications.  Patient verbalized understanding of and agreement with management plan.     Return if symptoms worsen or fail to improve.      Transcribed from ambient dictation for NAJMA Joe by NAJMA Joe.  10/04/24   15:21 EDT    Patient or patient representative verbalized consent for the use of Ambient Listening during the visit with  NAJMA Joe for chart documentation. 10/8/2024  12:44 EDT

## 2024-12-20 ENCOUNTER — OFFICE VISIT (OUTPATIENT)
Dept: INTERNAL MEDICINE | Facility: CLINIC | Age: 59
End: 2024-12-20
Payer: COMMERCIAL

## 2024-12-20 VITALS
SYSTOLIC BLOOD PRESSURE: 126 MMHG | HEART RATE: 84 BPM | WEIGHT: 235.2 LBS | TEMPERATURE: 97.9 F | BODY MASS INDEX: 32.93 KG/M2 | HEIGHT: 71 IN | DIASTOLIC BLOOD PRESSURE: 78 MMHG | OXYGEN SATURATION: 98 %

## 2024-12-20 DIAGNOSIS — R73.03 BORDERLINE DIABETES: Primary | ICD-10-CM

## 2024-12-20 DIAGNOSIS — E53.9 VITAMIN B DEFICIENCY: ICD-10-CM

## 2024-12-20 DIAGNOSIS — E78.00 HYPERCHOLESTEREMIA: ICD-10-CM

## 2024-12-20 DIAGNOSIS — E55.9 VITAMIN D DEFICIENCY: ICD-10-CM

## 2024-12-20 LAB
EXPIRATION DATE: ABNORMAL
HBA1C MFR BLD: 5.9 % (ref 4.5–5.7)
Lab: ABNORMAL

## 2024-12-20 PROCEDURE — 83036 HEMOGLOBIN GLYCOSYLATED A1C: CPT | Performed by: NURSE PRACTITIONER

## 2024-12-20 PROCEDURE — 99214 OFFICE O/P EST MOD 30 MIN: CPT | Performed by: NURSE PRACTITIONER

## 2024-12-26 NOTE — PROGRESS NOTES
"Subjective   Chief Complaint   Patient presents with    [facility] paperwork        Tony Butler is a 59 y.o. male.    History of Present Illness  The patient presents for evaluation of diabetes.    A1c is 5.9.  He is no longer taking semaglutide to help with weight loss due to cost.  He has made dietary modifications, including a reduction in portion sizes, but has not eliminated any specific food items from his diet.  He has been working out at the gym.  He has been taking a multivitamin.  He feels well overall.  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  Patient's mother is elderly with dementia.        I have reviewed the following portions of the patient's history and confirmed they are accurate: allergies, current medications, past family history, past medical history, past social history, past surgical history, and problem list    Review of Systems  Pertinent items are noted in HPI.     Current Outpatient Medications on File Prior to Visit   Medication Sig    multivitamin with minerals tablet tablet Take 1 tablet by mouth Daily.    [DISCONTINUED] Semaglutide,0.25 or 0.5MG/DOS, (Ozempic, 0.25 or 0.5 MG/DOSE,) 2 MG/1.5ML solution pen-injector Inject 0.5 mg under the skin into the appropriate area as directed 1 (One) Time Per Week.     No current facility-administered medications on file prior to visit.       Objective   Vitals:    12/20/24 1440   BP: 126/78   BP Location: Left arm   Patient Position: Sitting   Cuff Size: Large Adult   Pulse: 84   Temp: 97.9 °F (36.6 °C)   SpO2: 98%   Weight: 107 kg (235 lb 3.2 oz)   Height: 180.3 cm (70.98\")     Body mass index is 32.82 kg/m².    Physical Exam  Vitals reviewed.   Constitutional:       Appearance: Normal appearance. He is well-developed.   HENT:      Head: Normocephalic and " atraumatic.      Nose: Nose normal.   Eyes:      General: Lids are normal.      Conjunctiva/sclera: Conjunctivae normal.      Pupils: Pupils are equal, round, and reactive to light.   Neck:      Thyroid: No thyromegaly.      Trachea: Trachea normal.   Cardiovascular:      Rate and Rhythm: Normal rate and regular rhythm.      Heart sounds: Normal heart sounds.   Pulmonary:      Effort: Pulmonary effort is normal. No respiratory distress.      Breath sounds: Normal breath sounds.   Skin:     General: Skin is warm and dry.   Neurological:      Mental Status: He is alert and oriented to person, place, and time.      GCS: GCS eye subscore is 4. GCS verbal subscore is 5. GCS motor subscore is 6.   Psychiatric:         Attention and Perception: Attention normal.         Mood and Affect: Mood normal.         Speech: Speech normal.         Behavior: Behavior normal. Behavior is cooperative.         Thought Content: Thought content normal.         Results  Laboratory Studies  A1c is 5.9.    Lab Results   Component Value Date    WBC 5.49 01/31/2024    HGB 14.9 01/31/2024    HCT 44.9 01/31/2024    MCV 85.9 01/31/2024     01/31/2024      Lab Results   Component Value Date    GLUCOSE 67 01/31/2024    BUN 19 01/31/2024    CREATININE 1.09 01/31/2024     01/31/2024    K 3.9 01/31/2024     01/31/2024    CALCIUM 9.2 01/31/2024    PROTEINTOT 6.7 01/31/2024    ALBUMIN 4.0 01/31/2024    ALT 19 01/31/2024    AST 21 01/31/2024    ALKPHOS 64 01/31/2024    BILITOT 0.3 01/31/2024    GLOB 2.7 01/31/2024    AGRATIO 1.5 01/31/2024    BCR 17.4 01/31/2024    ANIONGAP 10.0 01/31/2024    EGFR 77.2 01/31/2024      Lab Results   Component Value Date    HGBA1C 5.9 (A) 12/20/2024      Lab Results   Component Value Date    CHOL 155 01/31/2024    TRIG 58 01/31/2024    HDL 42 01/31/2024     (H) 01/31/2024      Lab Results   Component Value Date    TSH 2.090 01/31/2024          Assessment & Plan   Problem List Items Addressed This  Visit       Vitamin B deficiency    Vitamin D deficiency    Borderline diabetes - Primary    Relevant Orders    POC Glycosylated Hemoglobin (Hb A1C) (Completed)     Other Visit Diagnoses       Hypercholesteremia                   Current Outpatient Medications:     multivitamin with minerals tablet tablet, Take 1 tablet by mouth Daily., Disp: , Rfl:     Assessment & Plan    His A1c level is currently at 5.9, which is an improvement from the previous reading of 6.3 taken 10 months ago. This indicates better glycemic control, though it remains borderline diabetic. He is advised to continue with his current dietary modifications, including reducing portion sizes and limiting sweets.  He will continue with a low-cholesterol high-fiber diet.  He will continue with multivitamin.  He is due for his annual blood work, including prostate cancer screening. He will come by for fasting labs. He is advised to fast for 7 to 8 hours prior to the test, with water being the only permissible intake.  Can start additional supplements based on results.          Plan of care reviewed with the patient at the conclusion of today's visit.  Education was provided regarding diagnosis, management, and any prescribed or recommended OTC medications.  Patient verbalized understanding of and agreement with management plan.     Return in about 6 months (around 6/20/2025), or if symptoms worsen or fail to improve.      Transcribed from ambient dictation for NAJMA Joe by NAJMA Joe.  12/26/24   07:08 EST    Patient or patient representative verbalized consent for the use of Ambient Listening during the visit with  NAJMA Joe for chart documentation. 12/26/2024  07:17 EST

## 2025-06-20 NOTE — PROGRESS NOTES
Physical Therapy Daily Progress Note    Subjective   Tony Butler reports: Tony reports feeling improvement in his left knee strength and has noticed a decrease in swelling. However, he reports that he still struggles with extended periods of weight bearing due to weakness in his left knee. Patient also reports communicating with his physician and coming to the conclusion that the patient will return to work next week, and will work approximately 4 hours per day for 5 days to assess the patient's ability to tolerate work related activities.     Today's Pain ratin/10    Objective   See Exercise, Manual, and Modality Logs for complete treatment.       Assessment/Plan   The patient reported increased fatigue and soreness following today's session due to implementation of increased number of exercises, which challenged his ability in standing. Will continue to progress interventions to mimic work-related activities the patient may perform.     Progress strengthening /stabilization /functional activity           Manual Therapy:         mins  87651;  Therapeutic Exercise:   50     mins  06010;     Neuromuscular Morgan:        mins  93163;    Therapeutic Activity:   15       mins  81202;     Gait Training:           mins  76958;     Ultrasound:          mins  78598;    Electrical Stimulation:    20     mins  77596 ( );  Iontophoresis          mins 09018   Traction          mins  60643  Fluidotherapy          mins  47497  Dry Needling          mins self-pay  Paraffin          mins  [id number]    Timed Treatment:   65   mins   Total Treatment:     85   mins    Glenn Barber, PT, DPT, OCS, Cert. DN  Physical Therapist    Patient seen evaluated by ZAK:      EKG: Normal sinus rhythm with a rate of 74.  Normal axis.  Normal intervals and durations.  No significant ST or T wave changes appreciated.  No acute signs of ischemia.  No significant change reviewed to previous EKG on 2/6/2018.     Faisal Newberry II, DO  06/20/25 1114